# Patient Record
Sex: MALE | Race: BLACK OR AFRICAN AMERICAN | Employment: UNEMPLOYED | URBAN - METROPOLITAN AREA
[De-identification: names, ages, dates, MRNs, and addresses within clinical notes are randomized per-mention and may not be internally consistent; named-entity substitution may affect disease eponyms.]

---

## 2022-09-07 ENCOUNTER — HOSPITAL ENCOUNTER (EMERGENCY)
Age: 37
Discharge: HOME | End: 2022-09-07
Attending: EMERGENCY MEDICINE

## 2022-09-07 ENCOUNTER — APPOINTMENT (OUTPATIENT)
Dept: GENERAL RADIOLOGY | Age: 37
End: 2022-09-07
Attending: EMERGENCY MEDICINE

## 2022-09-07 ENCOUNTER — TELEPHONE (OUTPATIENT)
Dept: ORTHOPEDIC SURGERY | Age: 37
End: 2022-09-07

## 2022-09-07 VITALS
OXYGEN SATURATION: 97 % | TEMPERATURE: 98.5 F | HEART RATE: 83 BPM | DIASTOLIC BLOOD PRESSURE: 85 MMHG | SYSTOLIC BLOOD PRESSURE: 105 MMHG | RESPIRATION RATE: 18 BRPM

## 2022-09-07 DIAGNOSIS — S62.234A CLOSED NONDISPLACED FRACTURE OF BASE OF FIRST METACARPAL BONE OF RIGHT HAND, UNSPECIFIED FRACTURE MORPHOLOGY, INITIAL ENCOUNTER: Primary | ICD-10-CM

## 2022-09-07 PROCEDURE — 73130 X-RAY EXAM OF HAND: CPT

## 2022-09-07 PROCEDURE — 99283 EMERGENCY DEPT VISIT LOW MDM: CPT

## 2022-09-07 PROCEDURE — 75810000053 HC SPLINT APPLICATION

## 2022-09-07 RX ORDER — NAPROXEN 500 MG/1
500 TABLET ORAL 2 TIMES DAILY WITH MEALS
Qty: 20 TABLET | Refills: 0 | Status: SHIPPED | OUTPATIENT
Start: 2022-09-07 | End: 2022-09-17
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
## 2022-09-07 NOTE — TELEPHONE ENCOUNTER
Patient called stating that he was seen in the er for a closed nondisplaced fracture of base of first metacarpal bone of right hand. He asked when can get in to be seen as he is a  and it is dominant hand.  Please advise patient at [phone]
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
## 2022-09-12 ENCOUNTER — OFFICE VISIT (OUTPATIENT)
Dept: ORTHOPEDIC SURGERY | Age: 37
End: 2022-09-12
Payer: COMMERCIAL

## 2022-09-12 VITALS
OXYGEN SATURATION: 97 % | WEIGHT: 218 LBS | DIASTOLIC BLOOD PRESSURE: 85 MMHG | HEART RATE: 76 BPM | SYSTOLIC BLOOD PRESSURE: 122 MMHG | BODY MASS INDEX: 27.99 KG/M2 | TEMPERATURE: 97.8 F

## 2022-09-12 DIAGNOSIS — S62.171A CLOSED DISPLACED FRACTURE OF TRAPEZIUM OF RIGHT WRIST, INITIAL ENCOUNTER: Primary | ICD-10-CM

## 2022-09-12 PROCEDURE — 99203 OFFICE O/P NEW LOW 30 MIN: CPT | Performed by: ORTHOPAEDIC SURGERY

## 2022-09-12 PROCEDURE — 25630 CLTX CARPL FX W/O MNPJ EA B1: CPT | Performed by: ORTHOPAEDIC SURGERY
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
## 2022-09-19 ENCOUNTER — HOSPITAL ENCOUNTER (OUTPATIENT)
Dept: CT IMAGING | Age: 37
Discharge: HOME | End: 2022-09-19
Attending: ORTHOPAEDIC SURGERY
Payer: COMMERCIAL

## 2022-09-19 DIAGNOSIS — S62.171A CLOSED DISPLACED FRACTURE OF TRAPEZIUM OF RIGHT WRIST, INITIAL ENCOUNTER: ICD-10-CM

## 2022-09-19 PROCEDURE — 73200 CT UPPER EXTREMITY W/O DYE: CPT

## 2022-09-23 ENCOUNTER — VIRTUAL VISIT (OUTPATIENT)
Dept: ORTHOPEDIC SURGERY | Age: 37
End: 2022-09-23
Payer: COMMERCIAL

## 2022-09-23 DIAGNOSIS — Z01.818 PREOP EXAMINATION: Primary | ICD-10-CM

## 2022-09-23 DIAGNOSIS — S62.231A CLOSED DISPLACED FRACTURE OF BASE OF FIRST METACARPAL BONE OF RIGHT HAND, UNSPECIFIED FRACTURE MORPHOLOGY, INITIAL ENCOUNTER: Primary | ICD-10-CM

## 2022-09-23 PROCEDURE — 99215 OFFICE O/P EST HI 40 MIN: CPT | Performed by: ORTHOPAEDIC SURGERY

## 2022-09-23 NOTE — PROGRESS NOTES
Aby Oreilly is a 42 y.o. male who was seen by synchronous (real-time) audio-video technology on 9/23/2022 for No chief complaint on file. Assessment & Plan:   Diagnoses and all orders for this visit:    1. Closed displaced fracture of base of first metacarpal bone of right hand, unspecified fracture morphology, initial encounter  -     SCHEDULE SURGERY        Schedule right first metacarpal base open reduction internal fixation. We will do a same-day consent and history and physical.    Discussion included CT scan findings, operative versus nonoperative treatment, postoperative convalescence, what to expect during rehab, and answering all questions. The complexity of medical decision making for this visit is high   Follow-up and Dispositions    Return for postop. I spent at least 45 minutes on this visit with this established patient. 712  Subjective:       Prior to Admission medications    Not on File       No Known Allergies  Past Medical History:   Diagnosis Date    Headache(784.0)      Past Surgical History:   Procedure Laterality Date    HX HERNIA REPAIR      age 3-4     Family History   Problem Relation Age of Onset    Diabetes Paternal Grandfather      Social History     Tobacco Use    Smoking status: Never    Smokeless tobacco: Never   Substance Use Topics    Alcohol use: No       Review of Systems   All other systems reviewed and are negative. 9/12/2022 right hand CT without contrast  IMPRESSION     1. Comminuted fractures at lateral base of first metacarpal with significant  deformity and irregularity of first MCP and subluxation. 2.  Subtle nondisplaced fracture concerned at adjacent trapezium. 3.  Mild wrist edema. Objective:   No flowsheet data found.    General: alert, cooperative, no distress   Mental  status: normal mood, behavior, speech, dress, motor activity, and thought processes, able to follow commands   HENT: NCAT   Neck: no visualized mass   Resp: no respiratory distress   Neuro: no gross deficits   Skin: no discoloration or lesions of concern on visible areas   Psychiatric: normal affect, consistent with stated mood, no evidence of hallucinations     Additional exam findings: On physical evaluation the arm was immobilized and the patient did not appear to be in any acute distress. We discussed the expected course, resolution and complications of the diagnosis(es) in detail. Medication risks, benefits, costs, interactions, and alternatives were discussed as indicated. I advised him to contact the office if his condition worsens, changes or fails to improve as anticipated. He expressed understanding with the diagnosis(es) and plan. Jose Ken, was evaluated through a synchronous (real-time) audio-video encounter. The patient (or guardian if applicable) is aware that this is a billable service, which includes applicable co-pays. This Virtual Visit was conducted with patient's (and/or legal guardian's) consent. The visit was conducted pursuant to the emergency declaration under the [facility] [address] authority and the [employer] and [employer] Act. Patient identification was verified, and a caregiver was present when appropriate. The patient was located at: Other: Walking outside  The provider was located at:  Facility (Appt Department): [facility] [address]

## 2022-09-26 ENCOUNTER — ANESTHESIA EVENT (OUTPATIENT)
Dept: SURGERY | Age: 37
End: 2022-09-26
Payer: COMMERCIAL

## 2022-09-26 PROBLEM — S62.211D CLOSED BENNETT'S FRACTURE OF RIGHT THUMB WITH ROUTINE HEALING: Status: ACTIVE | Noted: 2022-09-26

## 2022-09-26 NOTE — DISCHARGE INSTRUCTIONS
Ice and Elevate operative wound/dressing. Begin moving fingers immediately after surgery. Keep dressing clean and dry. Cover for showering. Do not remove dressing. Open Reduction With Internal Fixation of a Limb: What to Expect at [address]  Your broken bone (fracture) was put into position and stabilized. You can expect some pain and swelling around the cut (incision) the doctor made. This should get better within a few days after your surgery. But it is normal to have some pain for 2 to 3 weeks after surgery and mild pain for up to 6 weeks after surgery. How soon you can return to work and your normal routine depends on your job and how long it takes the bone to heal. For example, if you have a fractured leg and you sit at work, you may be able to go back in 1 to 2 weeks. But if your job requires you to walk or stand a lot, you will need to wait until your fracture has healed before you go back to work. This care sheet gives you a general idea about how long it will take for you to recover. But each person recovers at a different pace. Follow the steps below to get better as quickly as possible. How can you care for yourself at home? Activity    Rest when you feel tired. Getting enough sleep will help you recover. Increase your activity as recommended by your doctor. Being active boosts blood flow and helps prevent pneumonia and constipation. It's usually okay to exercise other parts of your body as soon as you feel well enough. Avoid putting weight on your repaired bone until your doctor says it is okay. You will probably need to take 1 to 2 weeks off from work. It depends on the type of work you do and how you feel. Do not shower for 1 or 2 days after surgery. When you shower, keep your dressing and incisions dry. If you have a cast, tape a sheet of plastic to cover it so that it does not get wet. It may help to sit on a shower stool.      Do not take a bath, swim, use a hot tub, or soak your affected limb until your incision is healed. This usually takes 1 to 2 weeks. Diet    You can eat your normal diet. If your stomach is upset, try bland, low-fat foods like plain rice, broiled chicken, toast, and yogurt. Medicines    Your doctor will tell you if and when you can restart your medicines. He or she will also give you instructions about taking any new medicines. If you take aspirin or some other blood thinner, ask your doctor if and when to start taking it again. Make sure that you understand exactly what your doctor wants you to do. Take pain medicines exactly as directed. If the doctor gave you a prescription medicine for pain, take it as prescribed. If you are not taking a prescription pain medicine, ask your doctor if you can take an over-the-counter medicine. If you think your pain medicine is making you sick to your stomach: Take your medicine after meals (unless your doctor has told you not to). Ask your doctor for a different pain medicine. If your doctor prescribed antibiotics, take them as directed. Do not stop taking them just because you feel better. You need to take the full course of antibiotics. Incision care    If you have strips of tape on the incision, leave the tape on for a week or until it falls off. If you do not have a cast, clean the incision 2 times a day after your doctor allows you to remove the bandage. Use only soap and water to clean the incision unless your doctor gives you different instructions. Don't use hydrogen peroxide or alcohol, which can slow healing. Exercise    Do exercises as instructed by your doctor or physical therapist. These exercises will help keep your muscles strong and your joints flexible while your bone is healing. Wiggle your fingers or toes on the injured arm or leg often. This helps reduce swelling and stiffness.    Ice and elevation    Prop up the injured arm or leg on a pillow when you ice it or anytime you sit or lie down during the first 1 to 2 weeks after your surgery. Try to keep it above the level of your heart. This will help reduce swelling and pain. Other instructions    If you have a cast or splint:  Keep it dry. If you have a removable splint, ask your doctor if it is okay to take it off to bathe. Your doctor may want you to keep it on as much as possible. Be careful not to put the splint on too tight. Do not stick objects such as pencils or coat hangers in your cast or splint to scratch your skin. Do not put powder into your cast or splint to relieve itchy skin. Never cut or alter your cast or splint. Follow-up care is a key part of your treatment and safety. Be sure to make and go to all appointments, and call your doctor if you are having problems. It's also a good idea to know your test results and keep a list of the medicines you take. When should you call for help? Call 911 anytime you think you may need emergency care. For example, call if:    You passed out (lost consciousness). You have severe trouble breathing. You have sudden chest pain and shortness of breath, or you cough up blood. Call your doctor now or seek immediate medical care if:    You have pain that does not get better after you take pain medicine. Your fingers or toes on the injured arm or leg are cool, pale, or change color. You have tingling or numbness in your fingers or toes. You cannot move your fingers or toes. Your cast or splint feels too tight. The skin under your cast or splint is burning or stinging. You have signs of infection, such as: Increased pain, swelling, warmth, or redness. Red streaks leading from the incision. Pus draining from the incision. A fever. You have drainage or a bad smell coming from the cast or splint. You have signs of a blood clot, such as:  Pain in your calf, back of the knee, thigh, or groin.   Redness and swelling in your leg or groin. You have new or worse nausea or vomiting. You are too sick to your stomach to drink any fluids. You cannot keep down fluids. Watch closely for any changes in your health, and be sure to contact your doctor if:    You have any problems with your cast or splint. Where can you learn more? Go to http://www.gray.com/  Enter O749 in the search box to learn more about \"Open Reduction With Internal Fixation of a Limb: What to Expect at Home. \"  Current as of: July 1, 2021               Content Version: 13.2  © 3061-7306 [employer]. Care instructions adapted under license by [employer] (which disclaims liability or warranty for this information). If you have questions about a medical condition or this instruction, always ask your healthcare professional. [address] any warranty or liability for your use of this information. DISCHARGE SUMMARY from Nurse    PATIENT INSTRUCTIONS:    After general anesthesia or intravenous sedation, for 24 hours or while taking prescription Narcotics:  Limit your activities  Do not drive and operate hazardous machinery  Do not make important personal or business decisions  Do  not drink alcoholic beverages  If you have not urinated within 8 hours after discharge, please contact your surgeon on call.     Report the following to your surgeon:  Excessive pain, swelling, redness or odor of or around the surgical area  Temperature over 100.5  Nausea and vomiting lasting longer than 4 hours or if unable to take medications  Any signs of decreased circulation or nerve impairment to extremity: change in color, persistent  numbness, tingling, coldness or increase pain  Any questions      These are general instructions for a healthy lifestyle:    No smoking/ No tobacco products/ Avoid exposure to second hand smoke  Surgeon General's Warning:  Quitting smoking now greatly reduces serious risk to your health. Obesity, smoking, and sedentary lifestyle greatly increases your risk for illness    A healthy diet, regular physical exercise & weight monitoring are important for maintaining a healthy lifestyle    You may be retaining fluid if you have a history of heart failure or if you experience any of the following symptoms:  Weight gain of 3 pounds or more overnight or 5 pounds in a week, increased swelling in our hands or feet or shortness of breath while lying flat in bed. Please call your doctor as soon as you notice any of these symptoms; do not wait until your next office visit. The discharge information has been reviewed with the patient and fiance. The patient verbalized understanding. Discharge medications reviewed with the patient and fiance and appropriate educational materials and side effects teaching were provided.   ___________________________________________________________________________________________________________________________________

## 2022-09-26 NOTE — H&P
History and Physical    Patient: Toi Ruffin  MRN: [id number]  SSN: [id number]   YOB: 1985  Age: 42 y.o. Sex: male     Patient scheduled for: Right first metacarpal open reduction internal fixation. Date of surgery: 9/27/2022. Location of surgery: [facility]. Surgeon: Raquel Castillo DO. Past Medical History:   Diagnosis Date    Headache(784.0)      Past Surgical History:   Procedure Laterality Date    HX HERNIA REPAIR      age 3-4     No Known Allergies    Family History   Problem Relation Age of Onset    Diabetes Paternal Grandfather         Social History     Socioeconomic History    Marital status: SINGLE   Tobacco Use    Smoking status: Never    Smokeless tobacco: Never   Vaping Use    Vaping Use: Never used   Substance and Sexual Activity    Alcohol use: No    Drug use: Not Currently    Sexual activity: Yes        Physical Examination    Visit Vitals  BP (!) 151/91 (BP 1 Location: Right upper arm, BP Patient Position: At rest;Semi fowlers)   Pulse 80   Temp 97.9 °F (36.6 °C)   Resp 20   Ht 6' 2\" (1.88 m)   Wt 216 lb 14.4 oz (98.4 kg)   SpO2 100%   BMI 27.85 kg/m²     Physical Exam  Vitals and nursing note reviewed. Constitutional:       General: He is not in acute distress. Appearance: Normal appearance. He is normal weight. He is not ill-appearing, toxic-appearing or diaphoretic. HENT:      Head: Normocephalic and atraumatic. Nose: Nose normal.      Mouth/Throat:      Mouth: Mucous membranes are moist.   Eyes:      Extraocular Movements: Extraocular movements intact. Pupils: Pupils are equal, round, and reactive to light. Cardiovascular:      Pulses: Normal pulses. Pulmonary:      Effort: Pulmonary effort is normal. No respiratory distress. Abdominal:      General: Abdomen is flat. There is no distension. Musculoskeletal:         General: Swelling and tenderness present. No deformity or signs of injury.       Cervical back: Normal range of motion and neck supple. Right lower leg: No edema. Left lower leg: No edema. Skin:     General: Skin is warm and dry. Capillary Refill: Capillary refill takes less than 2 seconds. Findings: No bruising or erythema. Neurological:      General: No focal deficit present. Mental Status: He is alert and oriented to person, place, and time. Psychiatric:         Mood and Affect: Mood normal.         Behavior: Behavior normal.      Right hand: There is tenderness to palpation at the base of the thumb. There is minimal range of motion due to pain and stiffness. Neurovascularly intact distally. Assessment      Patient Active Problem List    Diagnosis Date Noted    Closed Castañeda's fracture of right thumb with routine healing 09/26/2022      Plan      Proceed as scheduled for right thumb first metacarpal base open reduction internal fixation    The patient was counseled at length about the risks of ajay Covid-19 during their perioperative period and any recovery window from their procedure. The patient was made aware that ajay Covid-19  may worsen their prognosis for recovering from their procedure and lend to a higher morbidity and/or mortality risk. All material risks, benefits, and reasonable alternatives including postponing the procedure were discussed. The patient does  wish to proceed with the procedure at this time. This procedure has been fully reviewed with the patient and written informed consent has been obtained.       Signed By: Marleny Huff DO     September 27, 2022

## 2022-09-27 ENCOUNTER — ANESTHESIA (OUTPATIENT)
Dept: SURGERY | Age: 37
End: 2022-09-27
Payer: COMMERCIAL

## 2022-09-27 ENCOUNTER — HOSPITAL ENCOUNTER (OUTPATIENT)
Age: 37
Setting detail: OUTPATIENT SURGERY
Discharge: HOME | End: 2022-09-27
Attending: ORTHOPAEDIC SURGERY | Admitting: ORTHOPAEDIC SURGERY
Payer: COMMERCIAL

## 2022-09-27 ENCOUNTER — APPOINTMENT (OUTPATIENT)
Dept: PREADMISSION TESTING | Age: 37
End: 2022-09-27
Payer: COMMERCIAL

## 2022-09-27 VITALS
HEIGHT: 74 IN | SYSTOLIC BLOOD PRESSURE: 130 MMHG | BODY MASS INDEX: 27.83 KG/M2 | HEART RATE: 65 BPM | DIASTOLIC BLOOD PRESSURE: 87 MMHG | RESPIRATION RATE: 17 BRPM | TEMPERATURE: 97.3 F | WEIGHT: 216.9 LBS | OXYGEN SATURATION: 97 %

## 2022-09-27 DIAGNOSIS — S62.211D: Primary | ICD-10-CM

## 2022-09-27 LAB
COVID-19 RAPID TEST, COVR: NOT DETECTED
SOURCE, COVRS: NORMAL

## 2022-09-27 PROCEDURE — 26665 TREAT THUMB FRACTURE: CPT | Performed by: ORTHOPAEDIC SURGERY

## 2022-09-27 PROCEDURE — 77030040356 HC CORD BPLR FRCP COVD -A: Performed by: ORTHOPAEDIC SURGERY

## 2022-09-27 PROCEDURE — 77030020753 HC CUF TRNQT 1BLA STRY -B: Performed by: ORTHOPAEDIC SURGERY

## 2022-09-27 PROCEDURE — 01830 ANES ARTHR/NDSC WRST/HND NOS: CPT | Performed by: ANESTHESIOLOGY

## 2022-09-27 PROCEDURE — 01830 ANES ARTHR/NDSC WRST/HND NOS: CPT | Performed by: NURSE ANESTHETIST, CERTIFIED REGISTERED

## 2022-09-27 PROCEDURE — 77030002966 HC SUT PDS J&J -A: Performed by: ORTHOPAEDIC SURGERY

## 2022-09-27 PROCEDURE — 77030006689 HC BLD OPHTH BVR BD -A: Performed by: ORTHOPAEDIC SURGERY

## 2022-09-27 PROCEDURE — C1769 GUIDE WIRE: HCPCS | Performed by: ORTHOPAEDIC SURGERY

## 2022-09-27 PROCEDURE — 87635 SARS-COV-2 COVID-19 AMP PRB: CPT

## 2022-09-27 PROCEDURE — 76010000153 HC OR TIME 1.5 TO 2 HR: Performed by: ORTHOPAEDIC SURGERY

## 2022-09-27 PROCEDURE — 77030040922 HC BLNKT HYPOTHRM STRY -A: Performed by: ORTHOPAEDIC SURGERY

## 2022-09-27 PROCEDURE — 74011250636 HC RX REV CODE- 250/636: Performed by: NURSE ANESTHETIST, CERTIFIED REGISTERED

## 2022-09-27 PROCEDURE — 74011250637 HC RX REV CODE- 250/637: Performed by: ORTHOPAEDIC SURGERY

## 2022-09-27 PROCEDURE — C1713 ANCHOR/SCREW BN/BN,TIS/BN: HCPCS | Performed by: ORTHOPAEDIC SURGERY

## 2022-09-27 PROCEDURE — 74011000250 HC RX REV CODE- 250: Performed by: NURSE ANESTHETIST, CERTIFIED REGISTERED

## 2022-09-27 PROCEDURE — 74011000258 HC RX REV CODE- 258: Performed by: NURSE ANESTHETIST, CERTIFIED REGISTERED

## 2022-09-27 PROCEDURE — 74011250637 HC RX REV CODE- 250/637: Performed by: NURSE ANESTHETIST, CERTIFIED REGISTERED

## 2022-09-27 PROCEDURE — 74011000250 HC RX REV CODE- 250: Performed by: ORTHOPAEDIC SURGERY

## 2022-09-27 PROCEDURE — 77030034781 HC BIT DRL ACMD -D: Performed by: ORTHOPAEDIC SURGERY

## 2022-09-27 PROCEDURE — 76060000034 HC ANESTHESIA 1.5 TO 2 HR: Performed by: ORTHOPAEDIC SURGERY

## 2022-09-27 PROCEDURE — 77030014008 HC SPNG HEMSTAT J&J -C: Performed by: ORTHOPAEDIC SURGERY

## 2022-09-27 PROCEDURE — 77030002888 HC SUT CHRMC J&J -A: Performed by: ORTHOPAEDIC SURGERY

## 2022-09-27 PROCEDURE — 76210000000 HC OR PH I REC 2 TO 2.5 HR: Performed by: ORTHOPAEDIC SURGERY

## 2022-09-27 PROCEDURE — 77030040361 HC SLV COMPR DVT MDII -B: Performed by: ORTHOPAEDIC SURGERY

## 2022-09-27 PROCEDURE — 2709999900 HC NON-CHARGEABLE SUPPLY: Performed by: ORTHOPAEDIC SURGERY

## 2022-09-27 PROCEDURE — 76210000021 HC REC RM PH II 0.5 TO 1 HR: Performed by: ORTHOPAEDIC SURGERY

## 2022-09-27 DEVICE — 13.0MM, MICRO ACUTRAK 2® BONE SCREW
Type: IMPLANTABLE DEVICE | Site: WRIST | Status: FUNCTIONAL
Brand: ACUMED

## 2022-09-27 DEVICE — 18.0MM, MICRO ACUTRAK 2® BONE SCREW
Type: IMPLANTABLE DEVICE | Site: WRIST | Status: FUNCTIONAL
Brand: ACUMED

## 2022-09-27 RX ORDER — KETOROLAC TROMETHAMINE 15 MG/ML
INJECTION, SOLUTION INTRAMUSCULAR; INTRAVENOUS AS NEEDED
Status: DISCONTINUED | OUTPATIENT
Start: 2022-09-27 | End: 2022-09-27 | Stop reason: HOSPADM

## 2022-09-27 RX ORDER — LIDOCAINE HYDROCHLORIDE 10 MG/ML
0.1 INJECTION, SOLUTION EPIDURAL; INFILTRATION; INTRACAUDAL; PERINEURAL AS NEEDED
Status: DISCONTINUED | OUTPATIENT
Start: 2022-09-27 | End: 2022-09-27 | Stop reason: HOSPADM

## 2022-09-27 RX ORDER — HYDROCODONE BITARTRATE AND ACETAMINOPHEN 5; 325 MG/1; MG/1
1 TABLET ORAL
Status: COMPLETED | OUTPATIENT
Start: 2022-09-27 | End: 2022-09-27

## 2022-09-27 RX ORDER — SODIUM CHLORIDE, SODIUM LACTATE, POTASSIUM CHLORIDE, CALCIUM CHLORIDE 600; 310; 30; 20 MG/100ML; MG/100ML; MG/100ML; MG/100ML
75 INJECTION, SOLUTION INTRAVENOUS CONTINUOUS
Status: DISPENSED | OUTPATIENT
Start: 2022-09-27 | End: 2022-09-27

## 2022-09-27 RX ORDER — SODIUM CHLORIDE 0.9 % (FLUSH) 0.9 %
5-40 SYRINGE (ML) INJECTION EVERY 8 HOURS
Status: DISCONTINUED | OUTPATIENT
Start: 2022-09-27 | End: 2022-09-27 | Stop reason: HOSPADM

## 2022-09-27 RX ORDER — LIDOCAINE HYDROCHLORIDE 20 MG/ML
INJECTION, SOLUTION EPIDURAL; INFILTRATION; INTRACAUDAL; PERINEURAL AS NEEDED
Status: DISCONTINUED | OUTPATIENT
Start: 2022-09-27 | End: 2022-09-27 | Stop reason: HOSPADM

## 2022-09-27 RX ORDER — ONDANSETRON 2 MG/ML
4 INJECTION INTRAMUSCULAR; INTRAVENOUS ONCE
Status: DISCONTINUED | OUTPATIENT
Start: 2022-09-27 | End: 2022-09-27 | Stop reason: HOSPADM

## 2022-09-27 RX ORDER — FAMOTIDINE 20 MG/1
20 TABLET, FILM COATED ORAL ONCE
Status: COMPLETED | OUTPATIENT
Start: 2022-09-27 | End: 2022-09-27

## 2022-09-27 RX ORDER — SODIUM CHLORIDE 0.9 % (FLUSH) 0.9 %
5-40 SYRINGE (ML) INJECTION AS NEEDED
Status: DISCONTINUED | OUTPATIENT
Start: 2022-09-27 | End: 2022-09-27 | Stop reason: HOSPADM

## 2022-09-27 RX ORDER — FENTANYL CITRATE 50 UG/ML
50 INJECTION, SOLUTION INTRAMUSCULAR; INTRAVENOUS AS NEEDED
Status: DISCONTINUED | OUTPATIENT
Start: 2022-09-27 | End: 2022-09-27 | Stop reason: HOSPADM

## 2022-09-27 RX ORDER — ONDANSETRON 2 MG/ML
INJECTION INTRAMUSCULAR; INTRAVENOUS AS NEEDED
Status: DISCONTINUED | OUTPATIENT
Start: 2022-09-27 | End: 2022-09-27 | Stop reason: HOSPADM

## 2022-09-27 RX ORDER — HYDROMORPHONE HYDROCHLORIDE 1 MG/ML
0.5 INJECTION, SOLUTION INTRAMUSCULAR; INTRAVENOUS; SUBCUTANEOUS AS NEEDED
Status: DISCONTINUED | OUTPATIENT
Start: 2022-09-27 | End: 2022-09-27 | Stop reason: HOSPADM

## 2022-09-27 RX ORDER — NAPROXEN 500 MG/1
500 TABLET ORAL 2 TIMES DAILY
COMMUNITY
Start: 2022-08-16 | End: 2022-09-27

## 2022-09-27 RX ORDER — DEXAMETHASONE SODIUM PHOSPHATE 4 MG/ML
INJECTION, SOLUTION INTRA-ARTICULAR; INTRALESIONAL; INTRAMUSCULAR; INTRAVENOUS; SOFT TISSUE AS NEEDED
Status: DISCONTINUED | OUTPATIENT
Start: 2022-09-27 | End: 2022-09-27 | Stop reason: HOSPADM

## 2022-09-27 RX ORDER — DICLOFENAC SODIUM 75 MG/1
75 TABLET, DELAYED RELEASE ORAL 2 TIMES DAILY WITH MEALS
Qty: 20 TABLET | Refills: 0 | Status: SHIPPED | OUTPATIENT
Start: 2022-09-27 | End: 2022-10-07

## 2022-09-27 RX ORDER — OXYCODONE AND ACETAMINOPHEN 10; 325 MG/1; MG/1
1 TABLET ORAL
Qty: 20 TABLET | Refills: 0 | Status: SHIPPED | OUTPATIENT
Start: 2022-09-27 | End: 2022-10-02

## 2022-09-27 RX ORDER — FENTANYL CITRATE 50 UG/ML
INJECTION, SOLUTION INTRAMUSCULAR; INTRAVENOUS AS NEEDED
Status: DISCONTINUED | OUTPATIENT
Start: 2022-09-27 | End: 2022-09-27 | Stop reason: HOSPADM

## 2022-09-27 RX ORDER — SODIUM CHLORIDE, SODIUM LACTATE, POTASSIUM CHLORIDE, CALCIUM CHLORIDE 600; 310; 30; 20 MG/100ML; MG/100ML; MG/100ML; MG/100ML
25 INJECTION, SOLUTION INTRAVENOUS CONTINUOUS
Status: DISCONTINUED | OUTPATIENT
Start: 2022-09-27 | End: 2022-09-27 | Stop reason: HOSPADM

## 2022-09-27 RX ORDER — PROPOFOL 10 MG/ML
INJECTION, EMULSION INTRAVENOUS AS NEEDED
Status: DISCONTINUED | OUTPATIENT
Start: 2022-09-27 | End: 2022-09-27 | Stop reason: HOSPADM

## 2022-09-27 RX ADMIN — LIDOCAINE HYDROCHLORIDE 100 MG: 20 INJECTION, SOLUTION EPIDURAL; INFILTRATION; INTRACAUDAL; PERINEURAL at 09:23

## 2022-09-27 RX ADMIN — KETOROLAC TROMETHAMINE 15 MG: 15 INJECTION, SOLUTION INTRAMUSCULAR; INTRAVENOUS at 10:41

## 2022-09-27 RX ADMIN — DEXMEDETOMIDINE HYDROCHLORIDE 4 MCG: 100 INJECTION, SOLUTION, CONCENTRATE INTRAVENOUS at 09:35

## 2022-09-27 RX ADMIN — FAMOTIDINE 20 MG: 20 TABLET ORAL at 08:14

## 2022-09-27 RX ADMIN — FENTANYL CITRATE 25 MCG: 50 INJECTION, SOLUTION INTRAMUSCULAR; INTRAVENOUS at 09:43

## 2022-09-27 RX ADMIN — MIDAZOLAM HYDROCHLORIDE 2 MG: 2 INJECTION, SOLUTION INTRAMUSCULAR; INTRAVENOUS at 09:19

## 2022-09-27 RX ADMIN — DEXAMETHASONE SODIUM PHOSPHATE 4 MG: 4 INJECTION, SOLUTION INTRAMUSCULAR; INTRAVENOUS at 09:35

## 2022-09-27 RX ADMIN — FENTANYL CITRATE 25 MCG: 50 INJECTION, SOLUTION INTRAMUSCULAR; INTRAVENOUS at 09:46

## 2022-09-27 RX ADMIN — FENTANYL CITRATE 50 MCG: 50 INJECTION, SOLUTION INTRAMUSCULAR; INTRAVENOUS at 09:23

## 2022-09-27 RX ADMIN — ONDANSETRON 4 MG: 2 INJECTION INTRAMUSCULAR; INTRAVENOUS at 09:35

## 2022-09-27 RX ADMIN — PROPOFOL 200 MG: 10 INJECTION, EMULSION INTRAVENOUS at 09:23

## 2022-09-27 RX ADMIN — SODIUM CHLORIDE, POTASSIUM CHLORIDE, SODIUM LACTATE AND CALCIUM CHLORIDE 25 ML/HR: 600; 310; 30; 20 INJECTION, SOLUTION INTRAVENOUS at 08:14

## 2022-09-27 RX ADMIN — HYDROCODONE BITARTRATE AND ACETAMINOPHEN 1 TABLET: 5; 325 TABLET ORAL at 12:01

## 2022-09-27 NOTE — OP NOTES
Operative Report    Patient: Yasir Garcia MRN: [id number]  [id number]: [id number]    YOB: 1985  Age: 42 y.o. Sex: male       Date of Surgery: 9/27/2022     Preoperative Diagnosis: S62.231A CLOSED DISPLACED FRACTURE OF BASE OF FIRST METACARPAL BONE OF RIGHT HAND, UNSPECIFIED FRACTURE MORPHOLOGY, INITIAL ENCOUNTER     Postoperative Diagnosis: S62.231A CLOSED DISPLACED FRACTURE OF BASE OF FIRST METACARPAL BONE OF RIGHT HAND, UNSPECIFIED FRACTURE MORPHOLOGY, INITIAL ENCOUNTER     Surgeon(s) and Role:     Sarabjit Valdez, DO - Primary    Assistant: Kaleigh Littlejohn    Anesthesia: General + Wrist Block    Procedure: Procedure(s):  RIGHT FIRST METACARPAL OPEN REDUCTION INTERNAL FIXATION    Findings: Displaced intra-articular first metacarpal base fracture. Procedure in Detail:     Indications for procedure been outlined in the perioperative documentation, most notably being not amenable to conservative treatment. Informed consent was obtained from the patient. The risks and benefits of the procedure were discussed with the patient. They include but are not limited to neurovascular injury, tendon/ligamentous injury, blood loss, infection, need for further surgery, hematoma, neuroma, seroma, chronic pain, chronic stiffness, complications from anesthesia including death, and the possibility of ajay Covid. After informed consent was obtained, the patient was taken back to the operative suite. A tourniquet was applied to the operative extremity and it was prepped and draped in the normal sterile fashion. The arm was exsanguinated and the tourniquet was elevated to 250 mmHg. Prior to incision, approximately 8 mL of a mixture of quarter percent Marcaine plain 1% lidocaine plain was injected in the form of a superficial radial nerve wrist block as well as a median nerve wrist block.   Attention was turned to the hand where a 3 cm longitudinal incision was made over the base of the first metacarpal extending proximal over the [facility] joint. The subcutaneous tissues were dissected and electrocautery was used for hemostasis. Superficial veins were cauterized as needed. The superficial cutaneous branch of the radial nerve was retracted out of the field. The capsule was incised and carefully retracted subperiosteally over the base of the first metacarpal.  This was done nearly circumferentially approximately 60 to 70% of the base circumference of the first metacarpal.  The fracture was identified and found to have early soft callus formation. This soft callus was broken up. The fracture fragment was grossly aligned and held in that position. A K wire was passed across the fracture site. Reduction was confirmed on fluoroscopy. At this point a measurement was taken and the K wire was advanced through the [facility] joint. A drill was then passed over the K wire across the fracture site. An appropriately sized micro AccuTrack screw was then passed across the fracture site while reduction was held. Fluoroscopy was used to confirm compression across the fracture site. There was still some gapping across the fracture site and a second K wire was then passed in a slightly different trajectory. At this point another measurement was taken and the K wire was subsequently passed across the [facility] joint. The drill was passed over the K wire just across the fracture site. The appropriately sized screw was then passed across the fracture site. Fluoroscopy was used to confirm position of the screws and one of the screws was slightly backed out to avoid any intra-articular penetration. Final images were taken and the fracture was found to be grossly reduced on fluoroscopy as well as clinically in the wound. At this point the wound was copiously irrigated.   An additional 5 mL of quarter percent Marcaine plain 1% lidocaine mixture was injected into the surgical site deep and superficial.  The tourniquet was let down and electrocautery was used for hemostasis of any remaining bleeders. The wound was closed with 3-0 Monocryl followed by 4-0 Monocryl and Dermabond. The patient was placed in a sterile dressing and thumb spica splint. The patient was sent to recovery in stable condition and given appropriate wound care instructions, follow-up with me in the outpatient setting, and a prescription for pain medicine. Estimated Blood Loss: 10 mL    Tourniquet Time:   Total Tourniquet Time Documented:  Upper Arm (Right) - 53 minutes  Total: Upper Arm (Right) - 53 minutes        Implants:   Implant Name Type Inv. Item Serial No.  Lot No. LRB No. Used Action   SCREW BNE L13MM RADHA HDLSS COMPR ACUTRK 2 - [id number]  SCREW BNE L13MM RADHA HDLSS COMPR ACUTRK 2  [employer] N/A Right 1 Implanted   SCREW BNE L18MM NONSTERILE FOR HDLSS COMPR SYS RADHA ACUTRK 2 - [id number]  SCREW BNE L18MM NONSTERILE FOR HDLSS COMPR SYS RADHA ACUTRK 2  [employer] N/A Right 1 Implanted               Specimens: * No specimens in log *        Drains: None                Complications: None    Counts: Sponge and needle counts were correct times two.     Signed By:  Guy Thayer DO     September 27, 2022

## 2022-09-27 NOTE — ANESTHESIA PREPROCEDURE EVALUATION
Relevant Problems   No relevant active problems       Anesthetic History   No history of anesthetic complications            Review of Systems / Medical History  Patient summary reviewed and pertinent labs reviewed    Pulmonary          Smoker      Comments: Smokes marijuana occasionally, but not in several months. Neuro/Psych   Within defined limits           Cardiovascular                  Exercise tolerance: >4 METS     GI/Hepatic/Renal     GERD: well controlled           Endo/Other  Within defined limits           Other Findings   Comments: Thumb fracture, right.            Physical Exam    Airway  Mallampati: I  TM Distance: 4 - 6 cm  Neck ROM: normal range of motion   Mouth opening: Normal     Cardiovascular    Rhythm: regular  Rate: normal         Dental  No notable dental hx       Pulmonary  Breath sounds clear to auscultation               Abdominal  GI exam deferred       Other Findings            Anesthetic Plan    ASA: 2  Anesthesia type: general          Induction: Intravenous  Anesthetic plan and risks discussed with: Patient

## 2022-09-27 NOTE — ANESTHESIA POSTPROCEDURE EVALUATION
Procedure(s):  RIGHT FIRST METACARPAL OPEN REDUCTION INTERNAL FIXATION/MINI C-ARM/ACUMED. general    Anesthesia Post Evaluation      Multimodal analgesia: multimodal analgesia used between 6 hours prior to anesthesia start to PACU discharge  Patient location during evaluation: PACU  Patient participation: complete - patient participated  Level of consciousness: awake and alert  Pain management: adequate  Airway patency: patent  Anesthetic complications: no  Cardiovascular status: acceptable  Respiratory status: acceptable  Hydration status: acceptable  Post anesthesia nausea and vomiting:  none  Final Post Anesthesia Temperature Assessment:  Normothermia (36.0-37.5 degrees C)      INITIAL Post-op Vital signs:   Vitals Value Taken Time   /72 09/27/22 1142   Temp 36.3 °C (97.4 °F) 09/27/22 1102   Pulse 71 09/27/22 1149   Resp 13 09/27/22 1149   SpO2 97 % 09/27/22 1149   Vitals shown include unvalidated device data.

## 2022-09-27 NOTE — BRIEF OP NOTE
Brief Postoperative Note    Patient: Jordy Rosales  YOB: 1985  MRN: [id number]    Date of Procedure: 9/27/2022     Pre-Op Diagnosis: S62.231A CLOSED DISPLACED FRACTURE OF BASE OF FIRST METACARPAL BONE OF RIGHT HAND, UNSPECIFIED FRACTURE MORPHOLOGY, INITIAL ENCOUNTER    Post-Op Diagnosis: Same as preoperative diagnosis. Procedure(s):  RIGHT FIRST METACARPAL OPEN REDUCTION INTERNAL FIXATION/MINI C-ARM/ACUMED    Surgeon(s): Belkis Jean DO    Surgical Assistant: Surg Asst-1: Yohannes Miguel    Anesthesia: General + Wrist Block    Estimated Blood Loss (mL): less than 50     Complications: None    Specimens: * No specimens in log *     Implants:   Implant Name Type Inv.  Item Serial No.  Lot No. LRB No. Used Action   SCREW BNE L13MM RADHA HDLSS COMPR ACUTRK 2 - [id number]  SCREW BNE L13MM RADHA HDLSS COMPR ACUTRK 2  [employer] N/A Right 1 Implanted   SCREW BNE L18MM NONSTERILE FOR HDLSS COMPR SYS RADHA ACUTRK 2 - [id number]  SCREW BNE L18MM NONSTERILE FOR HDLSS COMPR SYS RADHA ACUTRK 2  [employer] N/A Right 1 Implanted       Drains: * No LDAs found *    Findings: displaced intra-articular fracture    Electronically Signed by Randee Alarcon DO on 9/27/2022 at 9:01 AM

## 2022-09-30 RX ORDER — MIDAZOLAM HYDROCHLORIDE 1 MG/ML
INJECTION, SOLUTION INTRAMUSCULAR; INTRAVENOUS AS NEEDED
Status: DISCONTINUED | OUTPATIENT
Start: 2022-09-27 | End: 2022-09-30 | Stop reason: HOSPADM

## 2022-10-01 NOTE — ADDENDUM NOTE
Addendum  created 09/30/22 2226 by Monica Ledezma CRNA    Intraprocedure Meds edited, Orders acknowledged in Narrator

## 2022-10-07 ENCOUNTER — OFFICE VISIT (OUTPATIENT)
Dept: ORTHOPEDIC SURGERY | Age: 37
End: 2022-10-07
Payer: COMMERCIAL

## 2022-10-07 VITALS — TEMPERATURE: 97.8 F | BODY MASS INDEX: 27.46 KG/M2 | HEIGHT: 74 IN | WEIGHT: 214 LBS

## 2022-10-07 DIAGNOSIS — Z87.81 S/P ORIF (OPEN REDUCTION INTERNAL FIXATION) FRACTURE: ICD-10-CM

## 2022-10-07 DIAGNOSIS — S62.231A CLOSED DISPLACED FRACTURE OF BASE OF FIRST METACARPAL BONE OF RIGHT HAND, UNSPECIFIED FRACTURE MORPHOLOGY, INITIAL ENCOUNTER: Primary | ICD-10-CM

## 2022-10-07 DIAGNOSIS — Z98.890 S/P ORIF (OPEN REDUCTION INTERNAL FIXATION) FRACTURE: ICD-10-CM

## 2022-10-07 PROCEDURE — 73140 X-RAY EXAM OF FINGER(S): CPT | Performed by: ORTHOPAEDIC SURGERY

## 2022-10-07 PROCEDURE — 99024 POSTOP FOLLOW-UP VISIT: CPT | Performed by: ORTHOPAEDIC SURGERY
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
## 2022-10-14 ENCOUNTER — HOSPITAL ENCOUNTER (OUTPATIENT)
Dept: PHYSICAL THERAPY | Age: 37
Discharge: HOME | End: 2022-10-14

## 2022-10-14 PROCEDURE — 97110 THERAPEUTIC EXERCISES: CPT

## 2022-10-14 PROCEDURE — 97165 OT EVAL LOW COMPLEX 30 MIN: CPT

## 2022-10-14 NOTE — PROGRESS NOTES
OCCUPATIONAL THERAPY - DAILY TREATMENT NOTE    Patient Name: Vicky Cohen        Date: 10/14/2022  : 1985   YES Patient  Verified  Visit #:      of   8  Insurance: Payor: /      In time: 9:30 Out time: 10:14   Total Treatment Time: 44       TREATMENT AREA =  Right hand    SUBJECTIVE    Pain Level (on 0 to 10 scale):   10   Medication Changes/New allergies or changes in medical history, any new surgeries or procedures? NO    If yes, update Summary List   Subjective Functional Status/Changes:  []  No changes reported     Will it get back to somewhat normal?         OBJECTIVE    Eval - 34    10 min Therapeutic Exercise:  [x]  See flow sheet   Rationale:      increase ROM and improve coordination to improve the patients ability to , pinch, and manipulate for ADL/IADL tasks. - thumb ROM HEPs     min Patient Education:  YES  Reviewed HEP   []  Progressed/Changed HEP based on: Other Objective/Functional Measures:    See POC      FOTO: 47   Post Treatment Pain Level (on 0 to 10) scale:   0  / 10     ASSESSMENT  Assessment/Changes in Function:     Pt with edema of the right wrist and thumb. Pt with decreased mobility of the right wrist. Pt with decreased mobility of the right hand      Eval Complexity: History: LOW Complexity : Brief history review ; Examination: LOW Complexity : 1-3 performance deficits relating to physical, cognitive , or psychosocial skils that result in activity limitations and / or participation restrictions ;  Decision Making:LOW Complexity : No comorbidities that affect functional and no verbal or physical assistance needed to complete eval tasks   Problem List: Pain effecting function, Decreased range of motion, Decreased strength, Edema effecting function, Decreased coordination/prehension, Decreased ADL/functional abilities , Decreased activity tolerance, and Decreased flexibility/joint mobility              Treatment Plan may include any combination of the following: Therapeutic exercise, Therapeutic activities, Physical agent/modality, Scar management, Manual therapy, Splinting/orthoses, Wound care, Patient education, and ADLs/IADLs  Patient / Family readiness to learn indicated by: asking questions, trying to perform skills, and interest  Persons(s) to be included in education: patient (P). []  See Progress Note/Recertification   Patient will continue to benefit from skilled OT services to modify and progress therapeutic interventions, address functional mobility deficits, address ROM deficits, address strength deficits, analyze and address soft tissue restrictions, analyze and cue movement patterns, analyze and modify body mechanics/ergonomics, and instruct in home and community integration to attain remaining goals. Progress toward goals / Updated goals:    Short Term Goals: To be accomplished in 3 weeks:  Patient will be independent in HEP for left UE ROM/stretches/strengthening to increase ROM and strength for ADL/IADL tasks. Patient will be independent in demonstrating and performing scar and edema management strategies to decrease swelling and improve participation in ADLs/IADLs. Patient will be independent in demonstrating and performing activity modification strategies to aid in success for work, self-care, meal preparation and home management tasks. Patient will increase right wrist extension and radial deviation to be within at least 10 degrees or less of unaffected side to improve participation in meal preparation and home management tasks. Long Term Goals: To be accomplished in 6  weeks:  Patient will increase right index, middle, ring, and small digit total active motion to 250 degrees or greater to improve ability in gripping a chopping knife. Patient will increase left thumb MP flexion to 50 degrees and left thumb IP flexion to 65 degrees or greater for ease of manipulating caps and lids on bottles.     Patient will increase left thumb radial abduction to 85 degrees or greater to improve participation in ADLs/IADLs.       PLAN  []  Upgrade activities as tolerated YES Continue plan of care   []  Discharge due to :    []  Other:      Therapist: Jamie Adams OT    Date: 10/14/2022 Time: 9:28 AM     Future Appointments   Date Time Provider [place]   10/14/2022  9:30 AM Gia Pritchett [place] [facility]   11/18/2022 10:10 AM Oren Dominguez, DO [facility]
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
## 2022-10-18 ENCOUNTER — APPOINTMENT (OUTPATIENT)
Dept: PHYSICAL THERAPY | Age: 37
End: 2022-10-18

## 2022-10-20 ENCOUNTER — HOSPITAL ENCOUNTER (OUTPATIENT)
Dept: PHYSICAL THERAPY | Age: 37
Discharge: HOME | End: 2022-10-20

## 2022-10-20 PROCEDURE — 97535 SELF CARE MNGMENT TRAINING: CPT

## 2022-10-20 PROCEDURE — 97530 THERAPEUTIC ACTIVITIES: CPT

## 2022-10-20 NOTE — PROGRESS NOTES
OCCUPATIONAL THERAPY - DAILY TREATMENT NOTE    Patient Name: Vannessa Zeng        Date: 10/20/2022  : 1985   YES Patient  Verified  Visit #:   2   of   8  Insurance: Payor: /      In time: 11:49 Out time: 12:35   Total Treatment Time: 46       TREATMENT AREA =  Right thumb pain    SUBJECTIVE    Pain Level (on 0 to 10 scale):  0  / 10   Medication Changes/New allergies or changes in medical history, any new surgeries or procedures? NO    If yes, update Summary List   Subjective Functional Status/Changes:  []  No changes reported     I feel that it has so much more movement after doing my exercises         OBJECTIVE    38 min Therapeutic Activity:    Rationale:   increase ROM and improve coordination to improve the patients ability to reach, , and pinch small/medium items for ADL/IADL participation     9 hole peg   Left 16 seconds   Right 18 seconds     - dexterity balls, cw/ccw, in hand x10 each way  - palm to digit translation with marbles  - tweezers and sponges  - 1\" pegs, opposition with each digit x10 each digit  - 1\" pegs sets of 3, in hand manipulate, placing with tip pinch x25      8 min Self Care/Home Management:    Rationale:    increase ROM and improve coordination to improve the patients ability to reach, , and pinch for ADL/IADL participation     - education on built up handles, provided red and blue foam  - putty food item HEP with tan/yellow putty mix  - thumb ROM HEP     min Patient Education:  YES  Reviewed HEP   []  Progressed/Changed HEP based on:   Putty food item HEP  Thumb AROM HEP     Other Objective/Functional Measures:    Min A for dexterity balls  Mod A for 1\" pegs with pinky  Pt tolerated translation activities      Post Treatment Pain Level (on 0 to 10) scale:   0  / 10     ASSESSMENT  Assessment/Changes in Function:     Pt able to complete opposition to RF at end of session.       []  See Progress Note/Recertification   Patient will continue to benefit from skilled OT services to modify and progress therapeutic interventions, address functional mobility deficits, address ROM deficits, address strength deficits, analyze and address soft tissue restrictions, analyze and cue movement patterns, analyze and modify body mechanics/ergonomics, and instruct in home and community integration to attain remaining goals. Progress toward goals / Updated goals:    Short Term Goals: To be accomplished in 3 weeks:  Patient will be independent in HEP for left UE ROM/stretches/strengthening to increase ROM and strength for ADL/IADL tasks. Patient will be independent in demonstrating and performing scar and edema management strategies to decrease swelling and improve participation in ADLs/IADLs. Patient will be independent in demonstrating and performing activity modification strategies to aid in success for work, self-care, meal preparation and home management tasks. Patient will increase right wrist extension and radial deviation to be within at least 10 degrees or less of unaffected side to improve participation in meal preparation and home management tasks. Long Term Goals: To be accomplished in 6  weeks:  Patient will increase right index, middle, ring, and small digit total active motion to 250 degrees or greater to improve ability in gripping a chopping knife. Patient will increase left thumb MP flexion to 50 degrees and left thumb IP flexion to 65 degrees or greater for ease of manipulating caps and lids on bottles. Patient will increase left thumb radial abduction to 85 degrees or greater to improve participation in ADLs/IADLs.          PLAN  []  Upgrade activities as tolerated YES Continue plan of care   []  Discharge due to :    []  Other:      Therapist: Joceline Ochoa OT    Date: 10/20/2022 Time: 11:51 AM     Future Appointments   Date Time Provider [place]   10/27/2022 11:00 AM Eric Castillo [place] [facility]   11/1/2022 11:45 AM Eric Castillo OT [facility]   11/3/2022 11:00 AM Sin Strickland, [facility]   11/8/2022 11:00 AM Sin Strickland [facility]   11/10/2022 11:00 AM Sin Strickland, [facility]   11/15/2022 11:00 AM Sin Strickland, [facility]   11/17/2022 11:00 AM Sin Strickland, [facility]   11/18/2022 10:10 AM Oren Byers DO [facility]   11/21/2022 11:00 AM Girma Damian OT [facility]   11/23/2022 11:00 AM Girma Damian OT [facility]

## 2022-10-25 ENCOUNTER — APPOINTMENT (OUTPATIENT)
Dept: PHYSICAL THERAPY | Age: 37
End: 2022-10-25

## 2022-10-27 ENCOUNTER — HOSPITAL ENCOUNTER (OUTPATIENT)
Dept: PHYSICAL THERAPY | Age: 37
Discharge: HOME | End: 2022-10-27

## 2022-10-27 PROCEDURE — 97530 THERAPEUTIC ACTIVITIES: CPT

## 2022-10-27 PROCEDURE — 97110 THERAPEUTIC EXERCISES: CPT

## 2022-10-27 NOTE — PROGRESS NOTES
OCCUPATIONAL THERAPY - DAILY TREATMENT NOTE    Patient Name: Joan Oakes        Date: 10/27/2022  : 1985   YES Patient  Verified  Visit #:   3   of   8  Insurance: Payor: /      In time: 10:49 Out time: 11:40   Total Treatment Time: 51       TREATMENT AREA =  right hand pain    SUBJECTIVE    Pain Level (on 0 to 10 scale):  0  / 10   Medication Changes/New allergies or changes in medical history, any new surgeries or procedures?     NO    If yes, update Summary List   Subjective Functional Status/Changes:  []  No changes reported     I think I overworked it the other day but this it felt fine after I let it rest for like 10 minutes       OBJECTIVE      11 min Therapeutic Exercise:    Rationale:    increase ROM, increase strength, and improve coordination to improve the patients ability to reach, , and pinch for ADL/IADL participation    - velcro blocks off with 3pt pinch, placing with tip pinch  - tan/yellow putty with 1/4\" peg removal     40 min Therapeutic Activity:    Rationale:    increase ROM, increase strength, and improve coordination to improve the patients ability to reach, , and pinch for ADL/IADL participation    - 1/4\" pegs translation palm to digit translation with multiple pegs in palm - out with digit to palm translation  - 1/4\" pegs opposition following pattern  - dexterity balls cw/cww x10  - nut and bolt board, taking off with tip pinch   - towel scrunches x8  - towel walks x 8     min Patient Education:  NO  Reviewed HEP   []  Progressed/Changed HEP based on:   Education on scars and what to do when his stitches fully dissolve     Other Objective/Functional Measures:    Min A for palm to digit translation with 1/4\" pegs  Min A for peg opposition with RF and little finger  10/10 1/4\" pegs found    Range of Motion:     Active     Norms Right Left   Wrist Flex 0-80 52 60    Ext 0-70 55 65    Ulnar Dev 0-30 40 45    Radial Dev 0-20 10 18          Post Treatment Pain Level (on 0 to 10) scale:   0  / 10     ASSESSMENT  Assessment/Changes in Function:     Pt progressing with range of motion and FM skills. []  See Progress Note/Recertification   Patient will continue to benefit from skilled OT services to modify and progress therapeutic interventions, address functional mobility deficits, address ROM deficits, address strength deficits, analyze and address soft tissue restrictions, analyze and modify body mechanics/ergonomics, and instruct in home and community integration to attain remaining goals. Progress toward goals / Updated goals:    Short Term Goals: To be accomplished in 3 weeks:  Patient will be independent in HEP for left UE ROM/stretches/strengthening to increase ROM and strength for ADL/IADL tasks. Patient will be independent in demonstrating and performing scar and edema management strategies to decrease swelling and improve participation in ADLs/IADLs. Patient will be independent in demonstrating and performing activity modification strategies to aid in success for work, self-care, meal preparation and home management tasks. Patient will increase right wrist extension and radial deviation to be within at least 10 degrees or less of unaffected side to improve participation in meal preparation and home management tasks. Long Term Goals: To be accomplished in 6  weeks:  Patient will increase right index, middle, ring, and small digit total active motion to 250 degrees or greater to improve ability in gripping a chopping knife. Patient will increase left thumb MP flexion to 50 degrees and left thumb IP flexion to 65 degrees or greater for ease of manipulating caps and lids on bottles. Patient will increase left thumb radial abduction to 85 degrees or greater to improve participation in ADLs/IADLs.       PLAN  []  Upgrade activities as tolerated YES Continue plan of care   []  Discharge due to :    []  Other:      Therapist: Juan Antonio Pollock OT    Date: 10/27/2022 Time: 9:26 AM     Future Appointments   Date Time Provider [place]   10/27/2022 11:00 AM Corbin Castleman, [place] [facility]   11/1/2022 11:45 AM Corbin Castleman, [place] [facility]   11/3/2022 11:00 AM Corbin Castleman, [place] [facility]   11/8/2022 11:00 AM Corbin Castleman, [place] [facility]   11/10/2022 11:00 AM Corbin Castleman, [place] [facility]   11/15/2022 11:00 AM Corbin Castleman, [place] [facility]   11/17/2022 11:00 AM Corbin Castleman, [place] [facility]   11/18/2022 10:10 AM Sterling Goldberg, Shawn A, DO [facility]   11/21/2022 11:00 AM Dhruv Tran, [place] [facility]   11/23/2022 11:00 AM Preble Juba, OT [facility]

## 2022-11-01 ENCOUNTER — APPOINTMENT (OUTPATIENT)
Dept: PHYSICAL THERAPY | Age: 37
End: 2022-11-01

## 2022-11-03 ENCOUNTER — HOSPITAL ENCOUNTER (OUTPATIENT)
Dept: PHYSICAL THERAPY | Age: 37
Discharge: HOME | End: 2022-11-03

## 2022-11-03 PROCEDURE — 97110 THERAPEUTIC EXERCISES: CPT

## 2022-11-03 PROCEDURE — 97140 MANUAL THERAPY 1/> REGIONS: CPT

## 2022-11-03 PROCEDURE — 97018 PARAFFIN BATH THERAPY: CPT

## 2022-11-03 NOTE — PROGRESS NOTES
OCCUPATIONAL THERAPY - DAILY TREATMENT NOTE    Patient Name: Gayle Lazaro        Date: 11/3/2022  : 1985   YES Patient  Verified  Visit #:   4   of   8  Insurance: Payor: /      In time: 11:00 Out time: 11:43   Total Treatment Time: 43       TREATMENT AREA =  right hand pain    SUBJECTIVE    Pain Level (on 0 to 10 scale):  0  / 10   Medication Changes/New allergies or changes in medical history, any new surgeries or procedures? NO    If yes, update Summary List   Subjective Functional Status/Changes:  []  No changes reported     The sticky thing came off and my hand if feeling really good. OBJECTIVE    Modalities Rationale:     decrease pain and increase tissue extensibility to improve patient's ability to reach, , and pinch using the R thumb.     min [] Estim, type/location:                                      []  att     []  unatt     []  w/US     []  w/ice    []  w/heat    min []  Ultrasound, settings/location:      min []  Iontophoresis w/ dexamethasone, location:                                               []  take home patch       []  in clinic   With  PF min []  Ice     [x]  Heat    location/position:    10 min [x]  Paraffin:  location Right hand    min []  Vasopneumatic Device, press/temp:     min []  Other:    [x] Skin assessment post-treatment (if applicable):    [x]  intact    []  redness- no adverse reaction     []redness - adverse reaction:        23 min Therapeutic Exercise:  [x]  See flow sheet   Rationale:       increase ROM, increase strength, and improve coordination to improve the patients ability to reach, , and pinch for ADL/IADL participation    - see flow sheet    10 min Manual Therapy: Mini scar vibrator to right thumb   Rationale:      increase ROM and increase tissue extensibility to improve patient's ability to reach, , and pinch for ADL/IADL participation.    The manual therapy interventions were performed at a separate and distinct time from the therapeutic activities interventions. min Patient Education:  NO  Reviewed HEP   []  Progressed/Changed HEP based on:   Pt educated on scar massage with dycem at home     Other Objective/Functional Measures:    Pt tolerated scar massage  Pt with Min A for opposition  Pt with increased speed with towel walks/scrunches     Post Treatment Pain Level (on 0 to 10) scale:   0  / 10     ASSESSMENT  Assessment/Changes in Function:     Pt would benefit from continued heat modalities pre-tx and continuation of scar massage. []  See Progress Note/Recertification   Patient will continue to benefit from skilled OT services to modify and progress therapeutic interventions, address functional mobility deficits, address ROM deficits, address strength deficits, analyze and address soft tissue restrictions, analyze and modify body mechanics/ergonomics, and instruct in home and community integration to attain remaining goals. Progress toward goals / Updated goals:    Short Term Goals: To be accomplished in 3 weeks:  Patient will be independent in HEP for left UE ROM/stretches/strengthening to increase ROM and strength for ADL/IADL tasks. Patient will be independent in demonstrating and performing scar and edema management strategies to decrease swelling and improve participation in ADLs/IADLs. Patient will be independent in demonstrating and performing activity modification strategies to aid in success for work, self-care, meal preparation and home management tasks. Patient will increase right wrist extension and radial deviation to be within at least 10 degrees or less of unaffected side to improve participation in meal preparation and home management tasks. Long Term Goals: To be accomplished in 6  weeks:  Patient will increase right index, middle, ring, and small digit total active motion to 250 degrees or greater to improve ability in gripping a chopping knife.    Patient will increase left thumb MP flexion to 50 degrees and left thumb IP flexion to 65 degrees or greater for ease of manipulating caps and lids on bottles. Patient will increase left thumb radial abduction to 85 degrees or greater to improve participation in ADLs/IADLs.       PLAN  []  Upgrade activities as tolerated YES Continue plan of care   []  Discharge due to :    []  Other:      Therapist: Robbie Willis OT    Date: 11/3/2022 Time: 10:58 AM     Future Appointments   Date Time Provider [place]   11/3/2022 11:00 AM Africa Larson, [place] [facility]   11/8/2022 11:00 AM Africa Larson, [place] [facility]   11/10/2022 11:00 AM Africa Larson [place] [facility]   11/15/2022 11:00 AM Africa Larson, [place] [facility]   11/17/2022 11:00 AM Africa Larson [place] [facility]   11/18/2022 10:10 AM Oren Ordaz,  [facility]   11/21/2022 11:00 AM Chucho Main OT [facility]   11/23/2022 11:00 AM Chucho Main OT [facility] No

## 2022-11-08 ENCOUNTER — APPOINTMENT (OUTPATIENT)
Dept: PHYSICAL THERAPY | Age: 37
End: 2022-11-08

## 2022-11-10 ENCOUNTER — HOSPITAL ENCOUNTER (OUTPATIENT)
Dept: PHYSICAL THERAPY | Age: 37
Discharge: HOME | End: 2022-11-10

## 2022-11-10 PROCEDURE — 97530 THERAPEUTIC ACTIVITIES: CPT

## 2022-11-10 PROCEDURE — 97018 PARAFFIN BATH THERAPY: CPT

## 2022-11-10 PROCEDURE — 97140 MANUAL THERAPY 1/> REGIONS: CPT

## 2022-11-10 PROCEDURE — 97110 THERAPEUTIC EXERCISES: CPT

## 2022-11-10 NOTE — PROGRESS NOTES
OCCUPATIONAL THERAPY - DAILY TREATMENT NOTE    Patient Name: Vicky Maya        Date: 11/10/2022  : 1985   YES Patient  Verified  Visit #:   5   of   8  Insurance: Payor: /      In time: 11:02 Out time: 11:44   Total Treatment Time: 42       TREATMENT AREA =  right hand pain    SUBJECTIVE    Pain Level (on 0 to 10 scale):  0  / 10   Medication Changes/New allergies or changes in medical history, any new surgeries or procedures? NO    If yes, update Summary List   Subjective Functional Status/Changes:  []  No changes reported     I think I over did it with the blue thing this weekend       OBJECTIVE  Modalities Rationale:     decrease pain and increase tissue extensibility to improve patient's ability to reach, , and pinch using the R thumb.                 min [] Estim, type/location:                                                            []  att     []  unatt     []  w/US     []  w/ice    []  w/heat     min []  Ultrasound, settings/location:        min []  Iontophoresis w/ dexamethasone, location:                                                []  take home patch       []  in clinic   With  PF min []  Ice     [x]  Heat    location/position:     10 min [x]  Paraffin:  location Right hand     min []  Vasopneumatic Device, press/temp:       min []  Other:     [x] Skin assessment post-treatment (if applicable):    [x]  intact    []  redness- no adverse reaction     []redness - adverse reaction:         12 min Therapeutic Exercise:  [x]  See flow sheet   Rationale:       increase ROM, increase strength, and improve coordination to improve the patients ability to reach, , and pinch for ADL/IADL participation     - see flow sheet     10 min Manual Therapy: Mini scar vibrator to right thumb   Rationale:      increase ROM and increase tissue extensibility to improve patient's ability to reach, , and pinch for ADL/IADL participation.    The manual therapy interventions were performed at a separate and distinct time from the therapeutic activities interventions. 10 min Therapeutic Activity:    Rationale:    increase ROM, increase strength, and improve coordination to improve the patients ability to , pinch and manipulate small items for ADL/IADL participation    - dexterity balls on table progressing to in hand cw/ccw x1 min each  - large nut on with MF off RF         min Patient Education:  NO  Reviewed HEP   []  Progressed/Changed HEP based on: Other Objective/Functional Measures:    Pt tolerated all activities. Pt continues to benefit form heat modalities pre-tx     Post Treatment Pain Level (on 0 to 10) scale:   0  / 10     ASSESSMENT  Assessment/Changes in Function:     Pt progressing towards all goals. []  See Progress Note/Recertification   Patient will continue to benefit from skilled OT services to modify and progress therapeutic interventions, address functional mobility deficits, address ROM deficits, address strength deficits, analyze and address soft tissue restrictions, analyze and modify body mechanics/ergonomics, and instruct in home and community integration to attain remaining goals. Progress toward goals / Updated goals:    Short Term Goals: To be accomplished in 3 weeks:  Patient will be independent in HEP for left UE ROM/stretches/strengthening to increase ROM and strength for ADL/IADL tasks. Patient will be independent in demonstrating and performing scar and edema management strategies to decrease swelling and improve participation in ADLs/IADLs. Patient will be independent in demonstrating and performing activity modification strategies to aid in success for work, self-care, meal preparation and home management tasks. Patient will increase right wrist extension and radial deviation to be within at least 10 degrees or less of unaffected side to improve participation in meal preparation and home management tasks. Long Term Goals:  To be accomplished in 6  weeks:  Patient will increase right index, middle, ring, and small digit total active motion to 250 degrees or greater to improve ability in gripping a chopping knife. Patient will increase left thumb MP flexion to 50 degrees and left thumb IP flexion to 65 degrees or greater for ease of manipulating caps and lids on bottles. Patient will increase left thumb radial abduction to 85 degrees or greater to improve participation in ADLs/IADLs.       PLAN  []  Upgrade activities as tolerated YES Continue plan of care   []  Discharge due to :    []  Other:      Therapist: Agustin Marshall OT    Date: 11/10/2022 Time: 11:09 AM     Future Appointments   Date Time Provider [place]   11/17/2022 11:00 AM Thuy Short [facility]   11/18/2022 10:10 AM Oren Polo DO [facility]   11/21/2022 11:00 AM Rosemarie Mejia OT [facility]

## 2022-11-15 ENCOUNTER — APPOINTMENT (OUTPATIENT)
Dept: PHYSICAL THERAPY | Age: 37
End: 2022-11-15

## 2022-11-17 ENCOUNTER — APPOINTMENT (OUTPATIENT)
Dept: PHYSICAL THERAPY | Age: 37
End: 2022-11-17

## 2022-11-18 ENCOUNTER — OFFICE VISIT (OUTPATIENT)
Dept: ORTHOPEDIC SURGERY | Age: 37
End: 2022-11-18
Payer: COMMERCIAL

## 2022-11-18 VITALS — OXYGEN SATURATION: 100 % | BODY MASS INDEX: 30.07 KG/M2 | WEIGHT: 222 LBS | HEART RATE: 68 BPM | HEIGHT: 72 IN

## 2022-11-18 DIAGNOSIS — S62.231A CLOSED DISPLACED FRACTURE OF BASE OF FIRST METACARPAL BONE OF RIGHT HAND, UNSPECIFIED FRACTURE MORPHOLOGY, INITIAL ENCOUNTER: Primary | ICD-10-CM

## 2022-11-18 PROCEDURE — 73130 X-RAY EXAM OF HAND: CPT | Performed by: ORTHOPAEDIC SURGERY

## 2022-11-18 PROCEDURE — 99024 POSTOP FOLLOW-UP VISIT: CPT | Performed by: ORTHOPAEDIC SURGERY
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
## 2022-11-21 ENCOUNTER — HOSPITAL ENCOUNTER (OUTPATIENT)
Dept: PHYSICAL THERAPY | Age: 37
Discharge: HOME | End: 2022-11-21

## 2022-11-21 PROCEDURE — 97018 PARAFFIN BATH THERAPY: CPT

## 2022-11-21 PROCEDURE — 97530 THERAPEUTIC ACTIVITIES: CPT

## 2022-11-21 PROCEDURE — 97110 THERAPEUTIC EXERCISES: CPT

## 2022-11-21 NOTE — PROGRESS NOTES
[address] [place] [address] PHYSICAL THERAPY  [address] [place] [address] Ev Patton, [address] - Phone: [phone]  Fax: [phone]  Progress Note/RECERTIFICATION FOR OCCUPATIONAL THERAPY          Patient Name: Preston Gan : 1985   Medical/Treatment Diagnosis: Closed displaced fracture of base of first metacarpal bone of right hand, initial encounter [K72.002A]  S/P ORIF (open reduction internal fixation) fracture [Z98.890, Z87.81]  Pain of right thumb [M79.644]   Onset Date: 2022    Referral Source: Blaise Jensen DO Start of [facility]): 10/14/2022   Prior Hospitalization: See Medical History Provider #: [id number]   Prior Level of Function: , bike riding, going to the gym, walking his dog   Comorbidities: NA   Medications: Verified on Patient Summary List   [facility]: 6 Missed Visits: 0     Goal/Measure of Progress Goal Met? 1. Patient will be independent in HEP for left UE ROM/stretches/strengthening to increase ROM and strength for ADL/IADL tasks. Status at last Eval: Previously initiated Current Status: Pt compliant with HEP for AROM/stretches/strengthening. yes   2. Patient will be independent in demonstrating and performing scar and edema management strategies to decrease swelling and improve participation in ADLs/IADLs. Status at last Eval: Previously initiated  Current Status: Pt reports continuing to use dycem for scar management. Pt reports no swelling. Pt reports minimal numbness at scar. Progressing. Continue to monitor. no   3. Patient will be independent in demonstrating and performing activity modification strategies to aid in success for work, self-care, meal preparation and home management tasks. Status at last Eval: Not yet initiated Current Status:  Pt reports using built up  for knifes. Continue to address. no   4.   Patient will increase right wrist extension and radial deviation to be within at least 10 degrees or less of unaffected side to improve participation in meal preparation and home management tasks. Status at last Eval: NT Current Status: Extension =  60        Radial deviation = 25. Wrist ROM WFL yes     5. Patient will increase right index, middle, ring, and small digit total active motion to 250 degrees or greater to improve ability in gripping a chopping knife. Status at last Eval: Index = 220  Middle = 242  Ring = 240  Small = 230 Current Status: Index = 235 (+15), Middle = 270 (+28), Ring = 265 (+25), Small = 255 (+25). WFL. See new goal for Index MP AROM. yes   6. Patient will increase left thumb MP flexion to 50 degrees and left thumb IP flexion to 65 degrees or greater for ease of manipulating caps and lids on bottles. Status at last Eval: 25 Current Status: MP = 32 (+7). IP NT. Continue to address. no   7. Patient will increase left thumb radial abduction to 85 degrees or greater to improve participation in ADLs/IADLs. Status at last Eval: 41 Current Status: 75 (+34). Progressing no     Key Functional Changes/Progress: Pt wrist AROM WFL. Pt with increased right thumb MP flexion (+7). Pt with increased right thumb radial abduction (+34). Pt with increased right index (+15), middle (+28), ring (+25), and small (+25) digit total active motion. Pt continuing to practice HEP for AROM/stretches/strengthening. Pt scar continuing with routine healing and pt reports minimal numbness at site of scar. Pt practicing scar management. Pt progressing towards goals.         Problem List: decrease ROM, decrease strength, decrease ADL/ functional abilitiies, decrease activity tolerance, and decrease flexibility/ joint mobility   Treatment Plan may include any combination of the following: Therapeutic exercise, Therapeutic activities, Physical agent/modality, Scar management, Manual therapy, Patient education, ADLs/IADLs, and Other  Patient Goal(s) has been updated and includes:       Goals for this certification period include and are to be achieved in   4  weeks:     Patient will be independent in demonstrating and performing scar and edema management strategies to decrease swelling and improve participation in ADLs/IADLs. Status at PN: Pt reports continuing to use dycem for scar management. Pt reports no swelling. Pt reports minimal numbness at scar. Progressing. Continue to monitor. Goal not met. 2. Patient will be independent in demonstrating and performing activity modification strategies to aid in success for work, self-care, meal preparation and home management tasks. Status at PN: Pt reports using built up  for knifes. Continue to address. Goal not met. 3. Patient will increase left thumb MP flexion to 50 degrees and left thumb IP flexion to 65 degrees or greater for ease of manipulating caps and lids on bottles. Status at PN: MP = 32 (+7). IP NT. Continue to address. Goal not met. 4. Patient will increase left thumb radial abduction to 85 degrees or greater to improve participation in ADLs/IADLs. Status at PN: 75 (+34). Progressing, goal not met. New goals:     5. Patient will increase right index MP AROM to 90 degrees or greater to improve ROM for ADLs/IADLs. Status at PN: 80    6. Patient will be able to participate in a kitchen task that involves repetitive cutting with no increase in pain/discomfort to improve activity tolerance and performance in work tasks. 7. Patient will participate in a functional light strengthening task for 2 minutes or greater to improve strength and activity tolerance for work tasks. Frequency / Duration:   Patient to be seen   1-2   times per week for   4    weeks:    Assessments/Recommendations: Pt would continue to benefit from skilled occupational therapy services to increase ROM, strength, and activity tolerance in order to improve performance and independence in ADLs/IADLs.      If you have any questions/comments please contact us directly at [phone]. Thank you for allowing us to assist in the care of your patient. Therapist Signature: TOMASA Brown/LI Date: 35/89/5325   Certification Period:  Reporting Period: N/A  10/14/2022 - 11/21/2022 Time: 8:10 AM   NOTE TO PHYSICIAN:  PLEASE COMPLETE THE ORDERS BELOW AND FAX TO   [facility]: [phone]. If you are unable to process this request in 24 hours please contact our office: [phone].    ___ I have read the above report and request that my patient continue as recommended.   ___ I have read the above report and request that my patient continue therapy with the following changes/special instructions: ________________________________________________   ___ I have read the above report and request that my patient be discharged from therapy.      Physician Signature:        Date:       Time:                                        Destiney Hernandez DO

## 2022-11-21 NOTE — PROGRESS NOTES
OCCUPATIONAL THERAPY - DAILY TREATMENT NOTE    Patient Name: Lopez Muñoz        Date: 2022  : 1985   YES Patient  Verified  Visit #:   6   of   8  Insurance: Payor: /      In time: 11:01 Out time: 11:47   Total Treatment Time: 46       TREATMENT AREA =  Right thumb     SUBJECTIVE    Pain Level (on 0 to 10 scale):  0  / 10   Medication Changes/New allergies or changes in medical history, any new surgeries or procedures? NO    If yes, update Summary List   Subjective Functional Status/Changes:  []  No changes reported     \"I saw doctor Bear Teague on Friday and he said everything looked good. \"          OBJECTIVE    Modalities Rationale:     decrease pain and increase tissue extensibility to improve patient's ability to  and pinch for ADL participation. min [] Estim, type/location:                                      []  att     []  unatt     []  w/US     []  w/ice    []  w/heat    min []  Ultrasound, settings/location:      min []  Iontophoresis w/ dexamethasone, location:                                               []  take home patch       []  in clinic   With paraffin min []  Ice     [x]  Heat    location/position: Right hand   10 min [x]  Paraffin:  location Right hand    min []  Vasopneumatic Device, press/temp:     min []  Other:    [x] Skin assessment post-treatment (if applicable):    [x]  intact    []  redness- no adverse reaction     []redness - adverse reaction:        28 min Therapeutic Exercise:  [x]  See flow sheet   Rationale:      increase ROM and increase strength to improve the patients ability to  and pinch for ADL participation. -recheck   -see flow sheet        8 min Therapeutic Activity:    Rationale:    increase ROM and increase strength to improve the patients ability to  and pinch for ADL participation.      -\" pegs in soft yellow theraputty   -dexterity balls 1 minute each direction in hand manipulation          min Patient Education:  NO  Reviewed HEP   []  Progressed/Changed HEP based on: Other Objective/Functional Measures:      Right wrist 11/21/22:  Extension = 60 WFL  Radial deviation = 25 [facility]        RIGHT hand Index Middle Ring Small Thumb   Right Thumb   11/21/22   MP  11/21/22 70  80   77  90 75  90 75  90      PIP  11/21/22 90  90     100  100 100  100 100  100 25 MP 32 (+7)   DIP  11/21/22 60  65   65  80 65  75 55  65                41 Radial Abd 75 (+34)   Total active motion (VALLEJO) 220 242 240 230        VALLEJO Right   11/21/22 235  (+15) 270  (+28) 265  (+25) 255  (+25)           Post Treatment Pain Level (on 0 to 10) scale:   0  / 10     ASSESSMENT  Assessment/Changes in Function:     Pt wrist AROM WFL. Pt with increased right thumb MP flexion (+7). Pt with increased right thumb radial abduction (+34). Pt with increased right index (+15), middle (+28), ring (+25), and small (+25) digit total active motion. Pt continuing to practice HEP for AROM/stretches/strengthening. Pt scar continuing with routine healing and pt reports minimal numbness at site of scar. Pt practicing scar management. Pt progressing towards goals. []  See Progress Note/Recertification   Patient will continue to benefit from skilled OT services to modify and progress therapeutic interventions, address ROM deficits, address strength deficits, analyze and modify body mechanics/ergonomics, and instruct in home and community integration to attain remaining goals. Progress toward goals / Updated goals:    Short Term Goals: To be accomplished in 3 weeks:  Patient will be independent in HEP for left UE ROM/stretches/strengthening to increase ROM and strength for ADL/IADL tasks. Status at PN: Pt compliant with HEP for AROM/stretches/strengthening. Goal met. Patient will be independent in demonstrating and performing scar and edema management strategies to decrease swelling and improve participation in ADLs/IADLs.     Status at PN: Pt reports continuing to use dycem for scar management. Pt reports no swelling. Pt reports minimal numbness at scar. Progressing. Continue to monitor. Goal not met. Patient will be independent in demonstrating and performing activity modification strategies to aid in success for work, self-care, meal preparation and home management tasks. Status at PN: Pt reports using built up  for knifes. Continue to address. Goal not met. Patient will increase right wrist extension and radial deviation to be within at least 10 degrees or less of unaffected side to improve participation in meal preparation and home management tasks. Status at PN: Extension =  60        Radial deviation = 25. Wrist ROM WFL. Goal met. Long Term Goals: To be accomplished in 6  weeks:  Patient will increase right index, middle, ring, and small digit total active motion to 250 degrees or greater to improve ability in gripping a chopping knife. Status at PN: Index = 235 (+15), Middle = 270 (+28), Ring = 265 (+25), Small = 255 (+25). WFL. See new goal for Index MP AROM. Patient will increase left thumb MP flexion to 50 degrees and left thumb IP flexion to 65 degrees or greater for ease of manipulating caps and lids on bottles. Status at PN: MP = 32 (+7). IP NT. Continue to address. Goal not met. Patient will increase left thumb radial abduction to 85 degrees or greater to improve participation in ADLs/IADLs. Status at PN: 75 (+34). Progressing, goal not met.         PLAN  []  Upgrade activities as tolerated YES Continue plan of care   []  Discharge due to :    []  Other:      Therapist: HONEY Mai    Date: 11/21/2022 Time: 8:09 AM     Future Appointments   Date Time Provider [place]   11/21/2022 11:00 AM Monica Clement OT [facility]

## 2022-11-23 ENCOUNTER — APPOINTMENT (OUTPATIENT)
Dept: PHYSICAL THERAPY | Age: 37
End: 2022-11-23

## 2022-12-08 ENCOUNTER — HOSPITAL ENCOUNTER (OUTPATIENT)
Dept: PHYSICAL THERAPY | Age: 37
Discharge: HOME | End: 2022-12-08

## 2022-12-08 PROCEDURE — 97530 THERAPEUTIC ACTIVITIES: CPT

## 2022-12-08 PROCEDURE — 97018 PARAFFIN BATH THERAPY: CPT

## 2022-12-08 PROCEDURE — 97110 THERAPEUTIC EXERCISES: CPT

## 2022-12-08 NOTE — PROGRESS NOTES
OCCUPATIONAL THERAPY - DAILY TREATMENT NOTE    Patient Name: Kajal President        Date: 2022  : 1985   YES Patient  Verified  Visit #:      8  Insurance: Payor: /      In time: 11:46 Out time: 12:31   Total Treatment Time: 45       TREATMENT AREA =  Right hand     SUBJECTIVE    Pain Level (on 0 to 10 scale):  0  / 10   Medication Changes/New allergies or changes in medical history, any new surgeries or procedures? NO    If yes, update Summary List   Subjective Functional Status/Changes:  []  No changes reported     \"Dr. Adriana Swenson said I am about 85% healed. \"     \"Definitely getting more mobility. \"          OBJECTIVE    Modalities Rationale:     decrease pain and increase tissue extensibility to improve patient's ability to  and pinch for ADL participation.      min [] Estim, type/location:                                      []  att     []  unatt     []  w/US     []  w/ice    []  w/heat    min []  Ultrasound, settings/location:      min []  Iontophoresis w/ dexamethasone, location:                                               []  take home patch       []  in clinic   With paraffin  min []  Ice     [x]  Heat    location/position: Right hand    10 min [x]  Paraffin:  location Right hand     min []  Vasopneumatic Device, press/temp:     min []  Other:    [x] Skin assessment post-treatment (if applicable):    [x]  intact    []  redness- no adverse reaction     []redness - adverse reaction:        27 min Therapeutic Exercise:  [x]  See flow sheet   Rationale:      increase ROM and increase strength to improve the patients ability to  and pinch for ADL participation.     -see flow sheet        8 min Therapeutic Activity:    Rationale:    increase ROM and increase strength to improve the patients ability to  and pinch for ADL participation.     -dexterity balls in hand manipulation 1 minute 30 seconds each direction   -x40 1\" pegs sets of three        min Patient Education:  NO  Reviewed HEP   []  Progressed/Changed HEP based on: Other Objective/Functional Measures:    -Min difficulty red web all positions   -x15/15 1/4\" pegs in medium red theraputty   -Mod difficulty three point pinch with 4# clip to retrieve sponges from table top   -Tolerated 10# gripper to remove 1\" pegs      Post Treatment Pain Level (on 0 to 10) scale:   0  / 10     ASSESSMENT  Assessment/Changes in Function:     Progressing with intrinsic hand strength      []  See Progress Note/Recertification   Patient will continue to benefit from skilled OT services to modify and progress therapeutic interventions, address ROM deficits, address strength deficits, analyze and modify body mechanics/ergonomics, and instruct in home and community integration to attain remaining goals. Progress toward goals / Updated goals:    Patient will be independent in demonstrating and performing scar and edema management strategies to decrease swelling and improve participation in ADLs/IADLs. Status at PN: Pt reports continuing to use dycem for scar management. Pt reports no swelling. Pt reports minimal numbness at scar. Progressing. Continue to monitor. Goal not met. 2. Patient will be independent in demonstrating and performing activity modification strategies to aid in success for work, self-care, meal preparation and home management tasks. Status at PN: Pt reports using built up  for knifes. Continue to address. Goal not met. 3. Patient will increase left thumb MP flexion to 50 degrees and left thumb IP flexion to 65 degrees or greater for ease of manipulating caps and lids on bottles. Status at PN: MP = 32 (+7). IP NT. Continue to address. Goal not met. 4. Patient will increase left thumb radial abduction to 85 degrees or greater to improve participation in ADLs/IADLs. Status at PN: 75 (+34). Progressing, goal not met.       5. Patient will increase right index MP AROM to 90 degrees or greater to improve ROM for ADLs/IADLs. Status at PN: 80     6. Patient will be able to participate in a kitchen task that involves repetitive cutting with no increase in pain/discomfort to improve activity tolerance and performance in work tasks. 7. Patient will participate in a functional light strengthening task for 2 minutes or greater to improve strength and activity tolerance for work tasks.       PLAN  []  Upgrade activities as tolerated YES Continue plan of care   []  Discharge due to :    []  Other:      Therapist: HONEY Deleon    Date: 12/8/2022 Time: 9:37 AM     Future Appointments   Date Time Provider [place]   12/8/2022 11:45 AM Jj Núñez OT [facility]   12/15/2022 11:45 AM Anatoliy Aguilar OT [facility]   12/22/2022 11:45 AM Jj Núñez OT [facility]   12/30/2022 11:45 AM Anatoliy Aguilar OT [facility]

## 2022-12-15 ENCOUNTER — HOSPITAL ENCOUNTER (OUTPATIENT)
Dept: PHYSICAL THERAPY | Age: 37
End: 2022-12-15

## 2022-12-15 NOTE — PROGRESS NOTES
OCCUPATIONAL THERAPY - DAILY TREATMENT NOTE    Patient Name: Brando Martinez        Date: 12/15/2022  : 1985   YES Patient  Verified  Visit #:   2   of   8  Insurance: Payor: /      In time:  Out time:    Total Treatment Time:        TREATMENT AREA =  Right hand     SUBJECTIVE    Pain Level (on 0 to 10 scale):  0  / 10   Medication Changes/New allergies or changes in medical history, any new surgeries or procedures? NO    If yes, update Summary List   Subjective Functional Status/Changes:  []  No changes reported              OBJECTIVE    Modalities Rationale:     decrease pain and increase tissue extensibility to improve patient's ability to  and pinch for ADL participation. min [] Estim, type/location:                                      []  att     []  unatt     []  w/US     []  w/ice    []  w/heat    min []  Ultrasound, settings/location:      min []  Iontophoresis w/ dexamethasone, location:                                               []  take home patch       []  in clinic   With paraffin  min []  Ice     [x]  Heat    location/position: Right hand    10 min [x]  Paraffin:  location Right hand     min []  Vasopneumatic Device, press/temp:     min []  Other:    [x] Skin assessment post-treatment (if applicable):    [x]  intact    []  redness- no adverse reaction     []redness - adverse reaction:        27 min Therapeutic Exercise:  [x]  See flow sheet   Rationale:      increase ROM and increase strength to improve the patients ability to  and pinch for ADL participation.     -see flow sheet        8 min Therapeutic Activity:    Rationale:    increase ROM and increase strength to improve the patients ability to  and pinch for ADL participation.     -dexterity balls in hand manipulation 1 minute 30 seconds each direction   -x40 1\" pegs sets of three        min Patient Education:  NO  Reviewed HEP   []  Progressed/Changed HEP based on:         Other Objective/Functional Measures:    -Min difficulty red web all positions   -x15/15 1/4\" pegs in medium red theraputty   -Mod difficulty three point pinch with 4# clip to retrieve sponges from table top   -Tolerated 10# gripper to remove 1\" pegs      Post Treatment Pain Level (on 0 to 10) scale:   0  / 10     ASSESSMENT  Assessment/Changes in Function:     Progressing with intrinsic hand strength      []  See Progress Note/Recertification   Patient will continue to benefit from skilled OT services to modify and progress therapeutic interventions, address ROM deficits, address strength deficits, analyze and modify body mechanics/ergonomics, and instruct in home and community integration to attain remaining goals. Progress toward goals / Updated goals:    Patient will be independent in demonstrating and performing scar and edema management strategies to decrease swelling and improve participation in ADLs/IADLs. Status at PN: Pt reports continuing to use dycem for scar management. Pt reports no swelling. Pt reports minimal numbness at scar. Progressing. Continue to monitor. Goal not met. 2. Patient will be independent in demonstrating and performing activity modification strategies to aid in success for work, self-care, meal preparation and home management tasks. Status at PN: Pt reports using built up  for knifes. Continue to address. Goal not met. 3. Patient will increase left thumb MP flexion to 50 degrees and left thumb IP flexion to 65 degrees or greater for ease of manipulating caps and lids on bottles. Status at PN: MP = 32 (+7). IP NT. Continue to address. Goal not met. 4. Patient will increase left thumb radial abduction to 85 degrees or greater to improve participation in ADLs/IADLs. Status at PN: 75 (+34). Progressing, goal not met. 5. Patient will increase right index MP AROM to 90 degrees or greater to improve ROM for ADLs/IADLs. Status at PN: 80     6.  Patient will be able to participate in a kitchen task that involves repetitive cutting with no increase in pain/discomfort to improve activity tolerance and performance in work tasks. 7. Patient will participate in a functional light strengthening task for 2 minutes or greater to improve strength and activity tolerance for work tasks.       PLAN  []  Upgrade activities as tolerated YES Continue plan of care   []  Discharge due to :    []  Other:      Therapist: HONEY Wilde    Date: 12/15/2022 Time: 9:37 AM     Future Appointments   Date Time Provider [place]   12/15/2022 11:45 AM Nirmal Mayorga [place] [facility]   12/22/2022 11:45 AM Emelia Louis OT [facility]   12/30/2022 11:45 AM Nirmal Mayorga OT [facility]

## 2022-12-22 ENCOUNTER — HOSPITAL ENCOUNTER (OUTPATIENT)
Dept: PHYSICAL THERAPY | Age: 37
Discharge: HOME | End: 2022-12-22

## 2022-12-22 PROCEDURE — 97110 THERAPEUTIC EXERCISES: CPT

## 2022-12-22 PROCEDURE — 97018 PARAFFIN BATH THERAPY: CPT

## 2022-12-22 NOTE — PROGRESS NOTES
OT DAILY TREATMENT NOTE 10-18    Patient Name: Frankie Huff  Date:2022  : 1985  [x]  Patient  Verified  Payor: /    In time:1145  Out time:1225  Total Treatment Time (min): 40  Visit #: 2 of 8      Treatment Area: Closed displaced fracture of base of first metacarpal bone of right hand, initial encounter [L12.739A]  S/P ORIF (open reduction internal fixation) fracture [Z98.890, Z87.81]  Pain of right thumb [M79.644]    SUBJECTIVE  Pain Level (0-10 scale): 0/10  Any medication changes, allergies to medications, adverse drug reactions, diagnosis change, or new procedure performed?: [x] No    [] Yes (see summary sheet for update)  Subjective functional status/changes:   [] No changes reported  Patient aware that he will be DC next visit     OBJECTIVE    Modality rationale: decrease edema, decrease inflammation, decrease pain, and increase tissue extensibility to improve the patients ability to    Min Type Additional Details    [] Estim:  []Unatt       []IFC  []Premod                        []Other:  []w/ice   []w/heat  Position:  Location:    [] Estim: []Att    []TENS instruct  []NMES                    []Other:  []w/US   []w/ice   []w/heat  Position:  Location:    []  Traction: [] Cervical       []Lumbar                       [] Prone          []Supine                       []Intermittent   []Continuous Lbs:  [] before manual  [] after manual    []  Ultrasound: []Continuous   [] Pulsed                           []1MHz   []3MHz W/cm2:  Location:    []  Iontophoresis with dexamethasone         Location: [] Take home patch   [] In clinic   10 []  Ice     []  heat  []  Ice massage  []  Laser   [x]  Paraffin Position:  Location:    []  Laser with stim  []  Other:  Position:  Location:    []  Vasopneumatic Device Pressure:       [] lo [] med [] hi   Temperature: [] lo [] med [] hi     [x] Skin assessment post-treatment:  []intact []redness- no adverse reaction []redness - adverse reaction:      30 min Therapeutic Exercise:  [] See flow sheet :   Rationale: increase ROM and increase strength to improve the patients ability to     Towel Scrunches   Dexterity Balls  Medium Theraputty  Edil Ronald: 3 ways 15x       With   [] TE   [] TA   [] neuro   [] other: Patient Education: [x] Review HEP    [] Progressed/Changed HEP based on:   [] positioning   [] body mechanics   [] transfers   [] heat/ice application   [] Splint wear/care   [] Sensory re-education   [] scar management      [] other:            Other Objective/Functional Measures:      able to tolerate strengthening tasks with no c/o pain/discomfort     Pain Level (0-10 scale) post treatment: 0/10    ASSESSMENT/Changes in Function: strength improving, plan for DC next visit     Patient will continue to benefit from skilled OT services to modify and progress therapeutic interventions, address ROM deficits, address strength deficits, and instruct in home and community integration to attain remaining goals. []  See Plan of Care  []  See progress note/recertification  []  See Discharge Summary         Progress towards goals / Updated goals:  Patient will be independent in demonstrating and performing scar and edema management strategies to decrease swelling and improve participation in ADLs/IADLs. Status at PN: Pt reports continuing to use dycem for scar management. Pt reports no swelling. Pt reports minimal numbness at scar. Progressing. Continue to monitor. Goal not met. 2. Patient will be independent in demonstrating and performing activity modification strategies to aid in success for work, self-care, meal preparation and home management tasks. Status at PN: Pt reports using built up  for knifes. Continue to address. Goal not met. 3. Patient will increase left thumb MP flexion to 50 degrees and left thumb IP flexion to 65 degrees or greater for ease of manipulating caps and lids on bottles. Status at PN: MP = 32 (+7).  IP NT. Continue to address. Goal not met. 4. Patient will increase left thumb radial abduction to 85 degrees or greater to improve participation in ADLs/IADLs. Status at PN: 75 (+34). Progressing, goal not met. 5. Patient will increase right index MP AROM to 90 degrees or greater to improve ROM for ADLs/IADLs. Status at PN: 80     6. Patient will be able to participate in a kitchen task that involves repetitive cutting with no increase in pain/discomfort to improve activity tolerance and performance in work tasks. 7. Patient will participate in a functional light strengthening task for 2 minutes or greater to improve strength and activity tolerance for work tasks.      PLAN  []  Upgrade activities as tolerated     [x]  Continue plan of care  []  Update interventions per flow sheet       []  Discharge due to:_  []  Other:_      CLEM Polanco  12/22/2022  9:58 AM        Future Appointments   Date Time Provider [place]   12/22/2022 11:45 AM Monica Clement OT [facility] [address]   12/30/2022 11:45 AM Kelly Jefferson OT [facility] [address]

## 2022-12-30 ENCOUNTER — HOSPITAL ENCOUNTER (OUTPATIENT)
Dept: PHYSICAL THERAPY | Age: 37
End: 2022-12-30

## 2022-12-30 PROCEDURE — 97018 PARAFFIN BATH THERAPY: CPT

## 2022-12-30 PROCEDURE — 97535 SELF CARE MNGMENT TRAINING: CPT

## 2022-12-30 PROCEDURE — 97110 THERAPEUTIC EXERCISES: CPT

## 2022-12-30 NOTE — PROGRESS NOTES
[facility] PHYSICAL THERAPY  [address] [facility] [address], [address]   Phone: [phone]  Fax: [phone]    Occupational Therapy Discharge Note  Patient name: Lorne Webster Start of Care: 10/14/2022   Referral source: Claude Lamas DO : 1985   Medical/Treatment Diagnosis: Closed displaced fracture of base of first metacarpal bone of right hand, initial encounter [S62.645A]  S/P ORIF (open reduction internal fixation) fracture [R81.501, Z87.81]  Pain of right thumb [M79.644]  Payor: /  Onset Date: 2022         Prior Hospitalization: see medical history Provider#: [id number]   Medications: Verified on Patient Summary List    Comorbidities: NA  Prior Level of Function:, bike riding, going to the gym, walking his dog  Visits from Start of Care: 9    Missed Visits: 1  Reporting Period : 10/14/22 to 22    Summary of Care  Patient will be independent in demonstrating and performing scar and edema management strategies to decrease swelling and improve participation in ADLs/IADLs. Status at PN: Pt reports continuing to use dycem for scar management. Pt reports no swelling. Pt reports minimal numbness at scar. Progressing. Continue to monitor. Goal not met. Status at D/C: Goal met. 2. Patient will be independent in demonstrating and performing activity modification strategies to aid in success for work, self-care, meal preparation and home management tasks. Status at PN: Pt reports using built up  for knifes. Continue to address. Goal not met. Status at D/C: Goal met. 3. Patient will increase right thumb MP flexion to 50 degrees and right thumb IP flexion to 65 degrees or greater for ease of manipulating caps and lids on bottles. Status at PN: MP = 32 (+7). IP NT. Continue to address. Goal not met. Status at D/C: MP= 32 (maintained). IP= 39 (+27). Goal not met.       4. Patient will increase right thumb radial abduction to 85 degrees or greater to improve participation in ADLs/IADLs. Status at PN: 75 (+34). Progressing, goal not met. Status at D/C: 65 (-10) goal not met. 5. Patient will increase right index MP AROM to 90 degrees or greater to improve ROM for ADLs/IADLs. Status at PN: 80  Status at D/C: 88 (+8). Progressing, goal not met. 6. Patient will be able to participate in a kitchen task that involves repetitive cutting with no increase in pain/discomfort to improve activity tolerance and performance in work tasks. Status at PN: not initiated  Status at D/C: pt reports completing cooking tasks in the kitchen without difficulty. Goal met. 7. Patient will participate in a functional light strengthening task for 2 minutes or greater to improve strength and activity tolerance for work tasks. Status at PN: not initiated  Status at D/C: Goal met. ASSESSMENT/Changes in Function: Pt with increased VALLEJO of the IF, MF, and LF for the right hand. Pt with maintained MP flexion of the right thumb and an increase in IP flexion (+27). Pt continuing to progress with mobility and strength of the hand. Pt provided with HEPs for hand strengthening to continue progression. Pt has made excellent gains as a result of skilled OT services and is now able to complete home and work tasks without any assistance. Pt to be discharged at this time.  Thank you for your referral.          ASSESSMENT/RECOMMENDATIONS:  [x]Discontinue therapy: [x]Patient has reached or is progressing toward set goals      []Patient is non-compliant or has abdicated      []Due to lack of appreciable progress towards set goals    [facility] 12/30/2022 1:30 PM

## 2022-12-30 NOTE — PROGRESS NOTES
OCCUPATIONAL THERAPY - DAILY TREATMENT NOTE    Patient Name: Seun Seals        Date: 2022  : 1985   YES Patient  Verified  Visit #:   3  of   8  Insurance: Payor: /      In time: 11:46 Out time: 12:35   Total Treatment Time: 49       TREATMENT AREA =  Right hand     SUBJECTIVE    Pain Level (on 0 to 10 scale):  0  / 10   Medication Changes/New allergies or changes in medical history, any new surgeries or procedures? NO    If yes, update Summary List   Subjective Functional Status/Changes:  []  No changes reported     I am really thankful for all you guys have done to help me. OBJECTIVE    Modalities Rationale:     decrease pain and increase tissue extensibility to improve patient's ability to  and pinch for ADL participation. min [] Estim, type/location:                                      []  att     []  unatt     []  w/US     []  w/ice    []  w/heat    min []  Ultrasound, settings/location:      min []  Iontophoresis w/ dexamethasone, location:                                               []  take home patch       []  in clinic   With paraffin  min []  Ice     [x]  Heat    location/position: Right hand    10 min [x]  Paraffin:  location Right hand     min []  Vasopneumatic Device, press/temp:     min []  Other:    [x] Skin assessment post-treatment (if applicable):    [x]  intact    []  redness- no adverse reaction     []redness - adverse reaction:        29 min Therapeutic Exercise:  [x]  See flow sheet   Rationale:      increase ROM and increase strength to improve the patients ability to  and pinch for ADL participation.      - recheck  - see flow sheet      10 min Self Care/Home Management:     Rationale:    increase ROM and improve coordination to improve the patients ability to reach, , and pinch for ADL/IADL participation     - HEP for thumb strengthening with rubber band and putty  - Distrubution of green putty for progression of hand strength  - education/discussion on returning to the gym       min Patient Education:  NO  Reviewed HEP   []  Progressed/Changed HEP based on: Other Objective/Functional Measures:      RIGHT hand Index Middle Ring Small Thumb   Right Thumb   11/21/22 Right  Thumb  12/30/22   MP  11/21/22 12/30/22 70  80  88 77  90  91 75  90  90 75  90  90          PIP  11/21/22 12/30/22 90  90  95       100  100  100 100  100  100 100  100  100 25 MP 32 (+7) 32 (maintained)   DIP  11/21/22 12/30/22 60  65  70    65  80  82 65  75  72 55  65  71  12  IP   39 (+27)              Radial Abd 75 (+34) 65 (-10)   Total active motion (VALLEJO) 220 242 240 230          VALLEJO Right   11/21/22 235  (+15) 270  (+28) 265  (+25) 255  (+25)          VALLEJO   Right  12/30/22 253 (+18) 273 (+3) 262 (-3) 261 (+6)            Post Treatment Pain Level (on 0 to 10) scale:   0  / 10     ASSESSMENT  Assessment/Changes in Function:     Pt with increased VALLEJO of the IF, MF, and LF for the right hand. Pt with maintained MP flexion of the right thumb and an increase in IP flexion (+27). Pt continuing to progress with mobility and strength of the hand. Pt provided with HEPs for hand strengthening to continue progression. Pt has made excellent gains as a result of skilled OT services and is now able to complete home and work tasks without any assistance. Pt to be discharged at this time. Thank you for your referral.      []  See Progress Note/Recertification   Patient will continue to benefit from skilled OT services to modify and progress therapeutic interventions, address ROM deficits, address strength deficits, analyze and modify body mechanics/ergonomics, and instruct in home and community integration to attain remaining goals. Progress toward goals / Updated goals:    Patient will be independent in demonstrating and performing scar and edema management strategies to decrease swelling and improve participation in ADLs/IADLs.     Status at PN: Pt reports continuing to use dycem for scar management. Pt reports no swelling. Pt reports minimal numbness at scar. Progressing. Continue to monitor. Goal not met. Status at D/C: Goal met. 2. Patient will be independent in demonstrating and performing activity modification strategies to aid in success for work, self-care, meal preparation and home management tasks. Status at PN: Pt reports using built up  for knifes. Continue to address. Goal not met. Status at D/C: Goal met. 3. Patient will increase right thumb MP flexion to 50 degrees and right thumb IP flexion to 65 degrees or greater for ease of manipulating caps and lids on bottles. Status at PN: MP = 32 (+7). IP NT. Continue to address. Goal not met. Status at D/C: MP= 32 (maintained). IP= 39 (+27). Goal not met. 4. Patient will increase right thumb radial abduction to 85 degrees or greater to improve participation in ADLs/IADLs. Status at PN: 75 (+34). Progressing, goal not met. Status at D/C: 65 (-10) goal not met. 5. Patient will increase right index MP AROM to 90 degrees or greater to improve ROM for ADLs/IADLs. Status at PN: 80  Status at D/C: 88 (+8). Progressing, goal not met. 6. Patient will be able to participate in a kitchen task that involves repetitive cutting with no increase in pain/discomfort to improve activity tolerance and performance in work tasks. Status at D/C: pt reports completing cooking tasks in the kitchen without difficulty. Goal met. 7. Patient will participate in a functional light strengthening task for 2 minutes or greater to improve strength and activity tolerance for work tasks. Status at D/C: Goal met. PLAN  []  Upgrade activities as tolerated NO Continue plan of care   [x]  Discharge due to : Progressing towards/met goals.    []  Other:      Therapist: TOMASA Estrada/L    Date: 12/30/2022 Time: 9:37 AM     Future Appointments   Date Time Provider [place]   12/30/2022 11:45 AM Una Parra, [facility]

## 2023-04-24 ENCOUNTER — OFFICE VISIT (OUTPATIENT)
Age: 38
End: 2023-04-24

## 2023-04-24 VITALS — WEIGHT: 209.4 LBS | HEIGHT: 73 IN | BODY MASS INDEX: 27.75 KG/M2

## 2023-04-24 DIAGNOSIS — S62.231S: Primary | ICD-10-CM

## 2023-04-24 PROCEDURE — 99213 OFFICE O/P EST LOW 20 MIN: CPT | Performed by: ORTHOPAEDIC SURGERY

## 2023-04-24 PROCEDURE — 73140 X-RAY EXAM OF FINGER(S): CPT | Performed by: ORTHOPAEDIC SURGERY
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
## (undated) DEVICE — GAUZE,SPONGE,4"X4",16PLY,STRL,LF,10/TRAY: Brand: MEDLINE

## (undated) DEVICE — XEROFORM OCCLUSIVE GAUZE STRIP OVERWRAP, 3% BISMUTH TRIBROMOPHENATE IN PETROLATUM BLEND: Brand: XEROFORM

## (undated) DEVICE — BIPOLAR FORCEPS CORD: Brand: VALLEYLAB

## (undated) DEVICE — Device

## (undated) DEVICE — GARMENT,MEDLINE,DVT,INT,CALF,MED, GEN2: Brand: MEDLINE

## (undated) DEVICE — DRAPE EQUIP CARM MINI STRL

## (undated) DEVICE — STRETCH BANDAGE ROLL: Brand: DERMACEA

## (undated) DEVICE — DRAPE,HAND,STERILE: Brand: MEDLINE

## (undated) DEVICE — AGENT HEMSTAT W2XL3IN OXIDIZED REGENERATED CELOS ABSRB

## (undated) DEVICE — STERILE POLYISOPRENE POWDER-FREE SURGICAL GLOVES: Brand: PROTEXIS

## (undated) DEVICE — BLADE OPHTH MINI BEAV SHRP --

## (undated) DEVICE — SUTURE CHROMIC GUT SZ 5-0 L18IN ABSRB BRN L16MM PS-3 3/8 1636G

## (undated) DEVICE — GOWN,PREVENTION PLUS,XLN/XL,ST,24/CS: Brand: MEDLINE

## (undated) DEVICE — REM POLYHESIVE ADULT PATIENT RETURN ELECTRODE: Brand: VALLEYLAB

## (undated) DEVICE — INTENDED FOR TISSUE SEPARATION, AND OTHER PROCEDURES THAT REQUIRE A SHARP SURGICAL BLADE TO PUNCTURE OR CUT.: Brand: BARD-PARKER SAFETY BLADES SIZE 15, STERILE

## (undated) DEVICE — THREE-QUARTER SHEET: Brand: CONVERTORS

## (undated) DEVICE — PREP SKN CHLRAPRP APL 26ML STR --

## (undated) DEVICE — PACK PROCEDURE SURG MAJ W/ BASIN LF

## (undated) DEVICE — DISPOSABLE TOURNIQUET CUFF SINGLE BLADDER, DUAL PORT AND QUICK CONNECT CONNECTOR: Brand: COLOR CUFF

## (undated) DEVICE — BLANKET WRM AD W50XL85.8IN PACU FULL BODY FORC AIR

## (undated) DEVICE — .035" X 5.75" ST GUIDE WIRE: Brand: ACUMED

## (undated) DEVICE — INTENDED FOR TISSUE SEPARATION, AND OTHER PROCEDURES THAT REQUIRE A SHARP SURGICAL BLADE TO PUNCTURE OR CUT.: Brand: BARD-PARKER ®  SAFETY SCALPED

## (undated) DEVICE — SUTURE PDS II SZ 4-0 L27IN ABSRB VLT SH L26MM 1/2 CIR Z315H

## (undated) DEVICE — BANDAGE,ELASTIC,ESMARK,STERILE,4"X9',LF: Brand: MEDLINE

## (undated) DEVICE — KIT CLN UP BON SECOURS MARYV